# Patient Record
Sex: MALE | Race: WHITE | Employment: OTHER | URBAN - METROPOLITAN AREA
[De-identification: names, ages, dates, MRNs, and addresses within clinical notes are randomized per-mention and may not be internally consistent; named-entity substitution may affect disease eponyms.]

---

## 2017-05-28 ENCOUNTER — APPOINTMENT (OUTPATIENT)
Dept: GENERAL RADIOLOGY | Age: 72
End: 2017-05-28
Attending: EMERGENCY MEDICINE
Payer: MEDICARE

## 2017-05-28 ENCOUNTER — HOSPITAL ENCOUNTER (EMERGENCY)
Age: 72
Discharge: HOME | End: 2017-05-28
Attending: EMERGENCY MEDICINE
Payer: MEDICARE

## 2017-05-28 VITALS
RESPIRATION RATE: 16 BRPM | BODY MASS INDEX: 25.03 KG/M2 | HEIGHT: 74 IN | HEART RATE: 76 BPM | SYSTOLIC BLOOD PRESSURE: 133 MMHG | DIASTOLIC BLOOD PRESSURE: 73 MMHG | TEMPERATURE: 97.5 F | WEIGHT: 195 LBS | OXYGEN SATURATION: 97 %

## 2017-05-28 DIAGNOSIS — M79.5 FOREIGN BODY (FB) IN SOFT TISSUE: Primary | ICD-10-CM

## 2017-05-28 PROCEDURE — 99282 EMERGENCY DEPT VISIT SF MDM: CPT

## 2017-05-28 PROCEDURE — 90715 TDAP VACCINE 7 YRS/> IM: CPT | Performed by: EMERGENCY MEDICINE

## 2017-05-28 PROCEDURE — 74011250636 HC RX REV CODE- 250/636: Performed by: EMERGENCY MEDICINE

## 2017-05-28 PROCEDURE — 73130 X-RAY EXAM OF HAND: CPT

## 2017-05-28 PROCEDURE — 90471 IMMUNIZATION ADMIN: CPT

## 2017-05-28 PROCEDURE — 74011000250 HC RX REV CODE- 250: Performed by: EMERGENCY MEDICINE

## 2017-05-28 RX ORDER — KETOROLAC TROMETHAMINE 10 MG/1
TABLET, FILM COATED ORAL
COMMUNITY
End: 2018-03-19

## 2017-05-28 RX ORDER — BACITRACIN ZINC 500 [USP'U]/G
CREAM TOPICAL ONCE
Status: DISCONTINUED | OUTPATIENT
Start: 2017-05-28 | End: 2017-05-28 | Stop reason: HOSPADM

## 2017-05-28 RX ORDER — LIDOCAINE HYDROCHLORIDE 10 MG/ML
10 INJECTION, SOLUTION EPIDURAL; INFILTRATION; INTRACAUDAL; PERINEURAL ONCE
Status: COMPLETED | OUTPATIENT
Start: 2017-05-28 | End: 2017-05-28

## 2017-05-28 RX ORDER — CLINDAMYCIN HYDROCHLORIDE 300 MG/1
300 CAPSULE ORAL 4 TIMES DAILY
Qty: 16 CAP | Refills: 0 | Status: SHIPPED | OUTPATIENT
Start: 2017-05-28 | End: 2017-06-01

## 2017-05-28 RX ADMIN — TETANUS TOXOID, REDUCED DIPHTHERIA TOXOID AND ACELLULAR PERTUSSIS VACCINE, ADSORBED 0.5 ML: 5; 2.5; 8; 8; 2.5 SUSPENSION INTRAMUSCULAR at 12:19

## 2017-05-28 RX ADMIN — LIDOCAINE HYDROCHLORIDE 10 ML: 10 INJECTION, SOLUTION EPIDURAL; INFILTRATION; INTRACAUDAL; PERINEURAL at 12:20

## 2017-05-28 NOTE — ED TRIAGE NOTES
Pt has a large nail sticking through his right first finger. Pt denies any pain at this time. Reports he took 10 mg toradol at 1115 today. Pt unknown when his last tetanus shot was. Sensation present.

## 2017-05-28 NOTE — ED PROVIDER NOTES
HPI Comments: 73 y.o. male with past medical history significant for HTN, arthritis, prostate CA, GERD, sleep apnea, chronic pain, transfusion history, thromboembolus and DM who presents from home with chief complaint of wound. Per pt, he was working with a nail gun earlier this morning (5/28/2017) when the nail ejected and ricocheted and punctured through his right thumb (pt is right handed). His relative reports giving the pt 10 mg of Toradol around 1115 which provided moderate relief. He states is unsure when he last received a tetanus shot. While in the ED, the is present with nail still in place to his right thumb yet denies experiencing any pain at this time. He further denies fever, chills, N/V/D, CP, SOB, abd pain and urinary symptoms. There are no other acute medical concerns at this time. Social hx: Former smoker, No ETOH use      PCP: Thurlow Cockayne, MD    Note written by Hector Alvarenga, as dictated by Jr Milner. Jeffrey Sandoval MD 12:02 PM          The history is provided by the patient and a relative. Past Medical History:   Diagnosis Date    Arthritis     Spine and knees    Cancer ([address] 2008    Prostate    Chronic pain     Diabetes ([address]     controoled with diet and lost weight    GERD (gastroesophageal reflux disease)     Hypertension     Sleep apnea     compliant with c-pap    Thromboembolus ([address] 2008    DVT in one leg after Bilateral Knee replacement but doesn't remember which leg.     Transfusion history 2/11/2008    required 2units RBC        Past Surgical History:   Procedure Laterality Date    HX CERVICAL FUSION      HX HERNIA REPAIR Right 1983    Inguinal hernia repair    HX ORTHOPAEDIC Right 1987    right knee arthroscopic    HX ORTHOPAEDIC Right 1986    right wrist arthroscopic surgery    HX ORTHOPAEDIC Bilateral 2008    Knee arthroplasty    HX ORTHOPAEDIC Right 2014    index finger 1st joint cyst removed    HX PROSTATECTOMY  2008         Family History:   Problem Relation Age of Onset    Diabetes Mother     Alzheimer Father        Social History     Social History    Marital status:      Spouse name: N/A    Number of children: N/A    Years of education: N/A     Occupational History    Not on file. Social History Main Topics    Smoking status: Former Smoker     Packs/day: 0.00     Quit date: 2/4/1996    Smokeless tobacco: Never Used      Comment: occ cigars years ago Quit; smoked pipes    Alcohol use No    Drug use: No    Sexual activity: Not on file     Other Topics Concern    Not on file     Social History Narrative         ALLERGIES: Penicillins    Review of Systems   Constitutional: Negative for chills and fever. HENT: Negative for ear pain and sore throat. Eyes: Negative for pain. Respiratory: Negative for chest tightness and shortness of breath. Cardiovascular: Negative for chest pain and leg swelling. Gastrointestinal: Negative for abdominal pain, diarrhea, nausea and vomiting. Genitourinary: Negative for dysuria and flank pain. Musculoskeletal: Negative for back pain. Skin: Positive for wound (puncture wound to right thumb with nail still present). Negative for rash. Neurological: Negative for headaches. All other systems reviewed and are negative. Vitals:    05/28/17 1153   BP: 133/73   Pulse: 76   Resp: 16   Temp: 97.5 °F (36.4 °C)   SpO2: 97%   Weight: 88.5 kg (195 lb)   Height: 6' 2\" (1.88 m)            Physical Exam   Constitutional: He is oriented to person, place, and time. He appears well-developed and well-nourished. No distress. HENT:   Head: Normocephalic and atraumatic. Eyes: Pupils are equal, round, and reactive to light. No scleral icterus. Neck: Normal range of motion. Neck supple. No tracheal deviation present. Cardiovascular: Normal rate, regular rhythm, normal heart sounds and intact distal pulses. Exam reveals no gallop and no friction rub. No murmur heard.   Pulmonary/Chest: Effort normal and breath sounds normal. No respiratory distress. He has no wheezes. He has no rales. Abdominal: Soft. He exhibits no distension. There is no tenderness. There is no rebound and no guarding. Musculoskeletal: He exhibits no edema. Neurological: He is alert and oriented to person, place, and time. Sensation and motor intact to right thumb   Skin: Skin is warm and dry. Large nail present through right thumb pad, without active bleeding   Psychiatric: He has a normal mood and affect. Nursing note and vitals reviewed. Note written by Hector Johnson, as dictated by Diogenes Baxter. Tammy Sahu MD 12:02 PM    [facility]  ED Course       Foreign Body Removal  Date/Time: 5/28/2017 1:38 PM  Performed by: Li Simon Authorized by: Li Simon     Consent:     Consent obtained:  Verbal and written    Consent given by:  Patient  Location:     Location:  Finger    Finger location:  R thumb    Tendon involvement:  None  Procedure details:     Bloodless field: no      Removal mechanism:  Magnetic extraction    Foreign bodies recovered:  1    Description:  Large nail    Intact foreign body removal: yes    Post-procedure details:     Neurovascular status: intact      Confirmation:  No additional foreign bodies on visualization    Skin closure:  None    Dressing:  Antibiotic ointment and adhesive bandage    Patient tolerance of procedure: Tolerated well, no immediate complications        3:51 PM  The patient has been reevaluated. The patient is ready for discharge. The patient's signs, symptoms, diagnosis, and discharge instructions have been discussed and the patient/ family has conveyed their understanding. The patient is to follow up as recommended or return to the ED should their symptoms worsen. Plan has been discussed and the patient is in agreement. LABORATORY TESTS:  No results found for this or any previous visit (from the past 12 hour(s)).     IMAGING RESULTS:  XR HAND RT MIN 3 V   Final Result        Xr Hand Rt Min 3 V    Result Date: 5/28/2017  EXAM:  XR HAND RT MIN 3 V INDICATION:  fracture. COMPARISON: None. FINDINGS: Three views of the right hand demonstrate a nail traversing the palmar aspect of the distal thumb without visible associated fracture of the tuft. Incidental imaging of the remainder of the hand  demonstrates degenerative changes in the interphalangeal joints. IMPRESSION:  1. Nail through the palmar aspect of the distal thumb without visible associated fracture. MEDICATIONS GIVEN:  Medications   bacitracin (BACITRACIN) 500 unit/gram ointment (not administered)   diph,Pertuss(AC),Tet Vac-PF (BOOSTRIX) suspension 0.5 mL (0.5 mL IntraMUSCular Given 5/28/17 1219)   lidocaine (PF) (XYLOCAINE) 10 mg/mL (1 %) injection 10 mL (10 mL IntraDERMal Given by Provider 5/28/17 1220)       IMPRESSION:  1. Foreign body (FB) in soft tissue        PLAN:  1. Current Discharge Medication List      START taking these medications    Details   clindamycin (CLEOCIN) 300 mg capsule Take 1 Cap by mouth four (4) times daily for 4 days. Qty: 16 Cap, Refills: 0           2. Follow-up Information     Follow up With Details Comments [id number] Cait Aden MD Call  [facility] [address]  [phone]      [facility] EMERGENCY DEPT  If symptoms worsen [address] [place] [address]  [phone]          Return to ED for new or worsening symptoms       Alvin Jones.  Lyudmila Gil MD

## 2017-05-28 NOTE — DISCHARGE INSTRUCTIONS
We hope that we have addressed all of your medical concerns. The examination and treatment you received in the Emergency Department were for an emergent problem and were not intended as complete care. It is important that you follow up with your healthcare provider(s) for ongoing care. If your symptoms worsen or do not improve as expected, and you are unable to reach your usual health care provider(s), you should return to the Emergency Department. Today's healthcare is undergoing tremendous change, and patient satisfaction surveys are one of the many tools to assess the quality of medical care. You may receive a survey from the [employer] regarding your experience in the Emergency Department. I hope that your experience has been completely positive, particularly the medical care that I provided. As such, please participate in the survey; anything less than excellent does not meet my expectations or intentions. [facility] and [address] [place] [address] participate in nationally recognized quality of care measures. If your blood pressure is greater than 120/80, as reported below, we urge that you seek medical care to address the potential of high blood pressure, commonly known as hypertension. Hypertension can be hereditary or can be caused by certain medical conditions, pain, stress, or \"white coat syndrome. \"       Please make an appointment with your health care provider(s) for follow up of your Emergency Department visit. VITALS:   Patient Vitals for the past 8 hrs:   Temp Pulse Resp BP SpO2   05/28/17 1153 97.5 °F (36.4 °C) 76 16 133/73 97 %          Thank you for allowing us to provide you with medical care today. We realize that you have many choices for your emergency care needs. Please choose us in the future for any continued health care needs. Israel Gaitan, [address] [place] [address] [address] Office: [phone]            No results found for this or any previous visit (from the past 24 hour(s)). Xr Hand Rt Min 3 V    Result Date: 5/28/2017  EXAM:  XR HAND RT MIN 3 V INDICATION:  fracture. COMPARISON: None. FINDINGS: Three views of the right hand demonstrate a nail traversing the palmar aspect of the distal thumb without visible associated fracture of the tuft. Incidental imaging of the remainder of the hand  demonstrates degenerative changes in the interphalangeal joints. IMPRESSION:  1. Nail through the palmar aspect of the distal thumb without visible associated fracture.

## 2018-03-19 ENCOUNTER — APPOINTMENT (OUTPATIENT)
Dept: MRI IMAGING | Age: 73
End: 2018-03-19
Attending: INTERNAL MEDICINE
Payer: MEDICARE

## 2018-03-19 ENCOUNTER — HOSPITAL ENCOUNTER (OUTPATIENT)
Age: 73
Setting detail: OBSERVATION
Discharge: HOME | End: 2018-03-20
Attending: EMERGENCY MEDICINE | Admitting: INTERNAL MEDICINE
Payer: MEDICARE

## 2018-03-19 DIAGNOSIS — G45.9 TRANSIENT CEREBRAL ISCHEMIA, UNSPECIFIED TYPE: Primary | ICD-10-CM

## 2018-03-19 PROBLEM — K21.9 GERD (GASTROESOPHAGEAL REFLUX DISEASE): Status: ACTIVE | Noted: 2018-03-19

## 2018-03-19 PROBLEM — G45.4 TRANSIENT GLOBAL AMNESIA: Status: ACTIVE | Noted: 2018-03-19

## 2018-03-19 PROBLEM — I10 HTN (HYPERTENSION): Status: ACTIVE | Noted: 2018-03-19

## 2018-03-19 PROBLEM — G47.33 OSA (OBSTRUCTIVE SLEEP APNEA): Status: ACTIVE | Noted: 2018-03-19

## 2018-03-19 LAB
ALBUMIN SERPL-MCNC: 3.8 G/DL (ref 3.5–5)
ALBUMIN/GLOB SERPL: 1 {RATIO} (ref 1.1–2.2)
ALP SERPL-CCNC: 80 U/L (ref 45–117)
ALT SERPL-CCNC: 37 U/L (ref 12–78)
ANION GAP SERPL CALC-SCNC: 12 MMOL/L (ref 5–15)
AST SERPL-CCNC: 27 U/L (ref 15–37)
BASOPHILS # BLD: 0.1 K/UL (ref 0–0.1)
BASOPHILS NFR BLD: 1 % (ref 0–1)
BILIRUB SERPL-MCNC: 0.3 MG/DL (ref 0.2–1)
BUN SERPL-MCNC: 27 MG/DL (ref 6–20)
BUN/CREAT SERPL: 33 (ref 12–20)
CALCIUM SERPL-MCNC: 8.9 MG/DL (ref 8.5–10.1)
CHLORIDE SERPL-SCNC: 104 MMOL/L (ref 97–108)
CO2 SERPL-SCNC: 25 MMOL/L (ref 21–32)
CREAT SERPL-MCNC: 0.81 MG/DL (ref 0.7–1.3)
DIFFERENTIAL METHOD BLD: NORMAL
EOSINOPHIL # BLD: 0.4 K/UL (ref 0–0.4)
EOSINOPHIL NFR BLD: 5 % (ref 0–7)
ERYTHROCYTE [DISTWIDTH] IN BLOOD BY AUTOMATED COUNT: 13.9 % (ref 11.5–14.5)
GLOBULIN SER CALC-MCNC: 3.7 G/DL (ref 2–4)
GLUCOSE BLD STRIP.AUTO-MCNC: 161 MG/DL (ref 65–100)
GLUCOSE SERPL-MCNC: 103 MG/DL (ref 65–100)
HCT VFR BLD AUTO: 39.9 % (ref 36.6–50.3)
HGB BLD-MCNC: 13.5 G/DL (ref 12.1–17)
IMM GRANULOCYTES # BLD: 0 K/UL (ref 0–0.04)
IMM GRANULOCYTES NFR BLD AUTO: 0 % (ref 0–0.5)
LYMPHOCYTES # BLD: 1.9 K/UL (ref 0.8–3.5)
LYMPHOCYTES NFR BLD: 25 % (ref 12–49)
MCH RBC QN AUTO: 29.4 PG (ref 26–34)
MCHC RBC AUTO-ENTMCNC: 33.8 G/DL (ref 30–36.5)
MCV RBC AUTO: 86.9 FL (ref 80–99)
MONOCYTES # BLD: 0.7 K/UL (ref 0–1)
MONOCYTES NFR BLD: 8 % (ref 5–13)
NEUTS SEG # BLD: 4.8 K/UL (ref 1.8–8)
NEUTS SEG NFR BLD: 61 % (ref 32–75)
NRBC # BLD: 0 K/UL (ref 0–0.01)
NRBC BLD-RTO: 0 PER 100 WBC
PLATELET # BLD AUTO: 182 K/UL (ref 150–400)
PMV BLD AUTO: 11.3 FL (ref 8.9–12.9)
POTASSIUM SERPL-SCNC: 4.1 MMOL/L (ref 3.5–5.1)
PROT SERPL-MCNC: 7.5 G/DL (ref 6.4–8.2)
RBC # BLD AUTO: 4.59 M/UL (ref 4.1–5.7)
SERVICE CMNT-IMP: ABNORMAL
SODIUM SERPL-SCNC: 141 MMOL/L (ref 136–145)
WBC # BLD AUTO: 7.9 K/UL (ref 4.1–11.1)

## 2018-03-19 PROCEDURE — 36415 COLL VENOUS BLD VENIPUNCTURE: CPT | Performed by: FAMILY MEDICINE

## 2018-03-19 PROCEDURE — 99218 HC RM OBSERVATION: CPT

## 2018-03-19 PROCEDURE — A9576 INJ PROHANCE MULTIPACK: HCPCS | Performed by: RADIOLOGY

## 2018-03-19 PROCEDURE — 99284 EMERGENCY DEPT VISIT MOD MDM: CPT

## 2018-03-19 PROCEDURE — 80053 COMPREHEN METABOLIC PANEL: CPT | Performed by: FAMILY MEDICINE

## 2018-03-19 PROCEDURE — 70553 MRI BRAIN STEM W/O & W/DYE: CPT

## 2018-03-19 PROCEDURE — 96372 THER/PROPH/DIAG INJ SC/IM: CPT

## 2018-03-19 PROCEDURE — 74011250636 HC RX REV CODE- 250/636: Performed by: INTERNAL MEDICINE

## 2018-03-19 PROCEDURE — 82962 GLUCOSE BLOOD TEST: CPT

## 2018-03-19 PROCEDURE — 93005 ELECTROCARDIOGRAM TRACING: CPT

## 2018-03-19 PROCEDURE — 85025 COMPLETE CBC W/AUTO DIFF WBC: CPT | Performed by: FAMILY MEDICINE

## 2018-03-19 PROCEDURE — 74011250636 HC RX REV CODE- 250/636: Performed by: RADIOLOGY

## 2018-03-19 RX ORDER — SODIUM CHLORIDE 0.9 % (FLUSH) 0.9 %
5-10 SYRINGE (ML) INJECTION AS NEEDED
Status: DISCONTINUED | OUTPATIENT
Start: 2018-03-19 | End: 2018-03-20 | Stop reason: HOSPADM

## 2018-03-19 RX ORDER — ACETAMINOPHEN 325 MG/1
650 TABLET ORAL
Status: DISCONTINUED | OUTPATIENT
Start: 2018-03-19 | End: 2018-03-20 | Stop reason: HOSPADM

## 2018-03-19 RX ORDER — ENOXAPARIN SODIUM 100 MG/ML
40 INJECTION SUBCUTANEOUS EVERY 24 HOURS
Status: DISCONTINUED | OUTPATIENT
Start: 2018-03-19 | End: 2018-03-20

## 2018-03-19 RX ORDER — INSULIN LISPRO 100 [IU]/ML
INJECTION, SOLUTION INTRAVENOUS; SUBCUTANEOUS
Status: DISCONTINUED | OUTPATIENT
Start: 2018-03-19 | End: 2018-03-20

## 2018-03-19 RX ORDER — ACETAMINOPHEN 650 MG/1
650 SUPPOSITORY RECTAL
Status: DISCONTINUED | OUTPATIENT
Start: 2018-03-19 | End: 2018-03-20

## 2018-03-19 RX ORDER — ASPIRIN 81 MG/1
81 TABLET ORAL
COMMUNITY

## 2018-03-19 RX ORDER — LOSARTAN POTASSIUM 100 MG/1
100 TABLET ORAL
COMMUNITY

## 2018-03-19 RX ORDER — GUAIFENESIN 100 MG/5ML
81 LIQUID (ML) ORAL DAILY
Status: DISCONTINUED | OUTPATIENT
Start: 2018-03-20 | End: 2018-03-20

## 2018-03-19 RX ORDER — SODIUM CHLORIDE 0.9 % (FLUSH) 0.9 %
5-10 SYRINGE (ML) INJECTION EVERY 8 HOURS
Status: DISCONTINUED | OUTPATIENT
Start: 2018-03-19 | End: 2018-03-20 | Stop reason: HOSPADM

## 2018-03-19 RX ORDER — MAGNESIUM SULFATE 100 %
4 CRYSTALS MISCELLANEOUS AS NEEDED
Status: DISCONTINUED | OUTPATIENT
Start: 2018-03-19 | End: 2018-03-20

## 2018-03-19 RX ORDER — DEXTROSE 50 % IN WATER (D50W) INTRAVENOUS SYRINGE
12.5-25 AS NEEDED
Status: DISCONTINUED | OUTPATIENT
Start: 2018-03-19 | End: 2018-03-20

## 2018-03-19 RX ADMIN — ENOXAPARIN SODIUM 40 MG: 40 INJECTION SUBCUTANEOUS at 20:01

## 2018-03-19 RX ADMIN — GADOTERIDOL 17 ML: 279.3 INJECTION, SOLUTION INTRAVENOUS at 20:23

## 2018-03-19 RX ADMIN — Medication 10 ML: at 23:31

## 2018-03-19 NOTE — IP AVS SNAPSHOT
[facility] 
 
 
 [address] [place] [address] 
[phone] Patient: Desmond Singh MRN: [id number] [id number] About your hospitalization You were admitted on:  March 19, 2018 You last received care in the:  [facility]  MED SURG 2 You were discharged on:  March 20, 2018 Why you were hospitalized Your primary diagnosis was:  Transient Global Amnesia Your diagnoses also included:  Tia (Transient Ischemic Attack), Cervical Stenosis Of Spine, Estuardo (Obstructive Sleep Apnea), Gerd (Gastroesophageal Reflux Disease), Htn (Hypertension) Follow-up Information Follow up With Details Comments Contact Info Diane Valenzuela MD   [address] [place] [address] [address] [place] [address] 
[phone] Deepthi Weems MD Schedule an appointment as soon as possible for a visit If symptoms worsen [address] [facility] [address] [id number] 
[phone] Your Scheduled Appointments Thursday April 05, 2018  8:00 AM EDT  
PREADMISSION TESTING with [facility] PAT ASSESSMENT C [facility] (RI OR PRE ASSESSMENT)  
 [address] [place] [address]  
[phone] Thursday April 12, 2018 HERNIA VENTRAL REPAIR LAPAROSCOPIC with Asim Haynes MD  
1FM SURGERY (RI OR PRE ASSESSMENT)  
 [address] [place] [address]  
[phone] Discharge Orders None A check delfin indicates which time of day the medication should be taken. My Medications CONTINUE taking these medications Instructions Each Dose to Equal  
 Morning Noon Evening Bedtime  
 aspirin delayed-release 81 mg tablet Your last dose was: Your next dose is: Take 81 mg by mouth nightly. 81 mg  
    
   
   
   
  
 azelastine 137 mcg (0.1 %) nasal spray Commonly known as:  ASTELIN Your last dose was: Your next dose is: 1 Lucas by Both Nostrils route as needed for Rhinitis. Use in each nostril as directed 1 Spray  
    
   
   
   
  
 diazePAM 5 mg tablet Commonly known as:  VALIUM Your last dose was: Your next dose is: Take 1 Tab by mouth every eight (8) hours as needed (Spasms). Max Daily Amount: 15 mg.  
 5 mg  
    
   
   
   
  
 fexofenadine-pseudoephedrine  mg Tb12 Commonly known as:  ALLEGRA-D Your last dose was: Your next dose is: Take 1 Tab by mouth every twelve (12) hours as needed. 1 Tab FISH OIL PO Your last dose was: Your next dose is: Take 2 Caps by mouth nightly. 2 Cap  
    
   
   
   
  
 losartan 100 mg tablet Commonly known as:  COZAAR Your last dose was: Your next dose is: Take 100 mg by mouth nightly. 100 mg  
    
   
   
   
  
 mometasone 0.1 % ointment Commonly known as:  Aleksandar Omalley Your last dose was: Your next dose is:    
   
   
 Apply  to affected area as needed for Skin Irritation. multivitamin tablet Commonly known as:  ONE A DAY Your last dose was: Your next dose is: Take 1 Tab by mouth daily (after breakfast). 1 Tab  
    
   
   
   
  
 pantoprazole 20 mg tablet Commonly known as:  PROTONIX Your last dose was: Your next dose is: Take 20 mg by mouth Daily (before breakfast). 20 mg Discharge Instructions Patient Discharge Instructions Lou Wagoner / [id number] : 1945 Admitted 3/19/2018 Discharged: 3/20/2018 Primary Diagnoses Problem List as of 3/20/2018  Date Reviewed: 3/19/2018 Codes Class Noted - Resolved CECIL (obstructive sleep apnea) GERD (gastroesophageal reflux disease) HTN (hypertension) * (Principal)Transient global amnesia Cervical stenosis of spine Spinal stenosis Take Home Medications · It is important that you take the medication exactly as they are prescribed. · Keep your medication in the bottles provided by the pharmacist and keep a list of the medication names, dosages, and times to be taken in your wallet. · Do not take other medications without consulting your doctor. What to do at [facility] Recommended diet: Cardiac Diet and Low fat, Low cholesterol Recommended activity: Activity as tolerated If you experience worse symptoms, please follow up with Dr Shante Tucker in neurology. Follow-up with your PCP in a few weeks Transient Global Amnesia: Care Instructions Your Care Instructions Transient global amnesia is a rare type of amnesia that causes sudden memory loss. When this happens you cannot remember events from your recent past or make new memories. You may also not know where you are, why you are there, or what the date is. You may even ask the same question many times. Unlike other types of amnesia, you do know who you are and you can recognize people that you know. An episode usually does not last more than 6 hours and it rarely happens again. What causes transient global amnesia is not fully known. But, in some cases, migraines, an intense workout, sex, or stress may cause an episode. Your doctor probably did an exam and ran some tests to rule out certain health problems that can also cause sudden memory loss, such as a stroke, brain tumor, seizure, head injury, or an infection. If your doctor did not find any of these things to be the cause of your memory loss, you will not need treatment and you can go back to your usual activities. Although you may never be able to remember what happened right before or during the episode, the rest of your memory should come back. Follow-up care is a key part of your treatment and safety.  Be sure to make and go to all appointments, and call your doctor if you are having problems. It is also a good idea to know your test results and keep a list of the medicines you take. How can you care for yourself at home? · Take good care of yourself. Eat a balanced, low-fat diet. Get plenty of rest. Limit how much alcohol you drink. Do not smoke. If you need help quitting, talk to your doctor about stop-smoking programs and medicines. These can increase your chances of quitting for good. · Find healthy ways to deal with stress. Regular exercise is one of the best ways to manage stress. · Talk to a counselor if you're concerned about what happened. When should you call for help? Call 911 anytime you think you may need emergency care. For example, call if: 
? · You passed out (lost consciousness). ? · You have symptoms of a stroke. These may include: 
¨ Sudden numbness, tingling, weakness, or loss of movement in your face, arm, or leg, especially on only one side of your body. ¨ Sudden vision changes. ¨ Sudden trouble speaking. ¨ Sudden confusion or trouble understanding simple statements. ¨ Sudden problems with walking or balance. ¨ A sudden, severe headache that is different from past headaches. ? · You develop a fever with a stiff neck or a severe headache. ? · You do not know who you are or where you are. ?Call your doctor now or seek immediate medical care if: 
? · You suddenly lose your memory again. ? · You have a seizure. ? · You are dizzy. ? · You are more confused, forgetful, or upset than usual.  
? · You notice changes in your behavior or personality. ? · You begin to have trouble with familiar things, such as how to read or how to tell time. ? Watch closely for changes in your health, and be sure to contact your doctor if you have any problems. Where can you learn more? Go to http://pearl-eduardo.info/. Enter I190 in the search box to learn more about \"Transient Global Amnesia: Care Instructions. \" Current as of: October 14, 2016 Content Version: 11.4 © 2627-3286 [employer]. Care instructions adapted under license by [employer] (which disclaims liability or warranty for this information). If you have questions about a medical condition or this instruction, always ask your healthcare professional. [address] any warranty or liability for your use of this information. Information obtained by : 
I understand that if any problems occur once I am at home I am to contact my physician. I understand and acknowledge receipt of the instructions indicated above. Physician's or R.N.'s Signature                                                                  Date/Time Patient or Representative Signature                                                          Date/Time [employer] Announcement We are excited to announce that we are making your provider's discharge notes available to you in [employer]. You will see these notes when they are completed and signed by the physician that discharged you from your recent hospital stay. If you have any questions or concerns about any information you see in [employer], please call the Health Information Department where you were seen or reach out to your Primary Care Provider for more information about your plan of care. Introducing [facility]! Dear Bharat Gray: Thank you for requesting a [employer] account. Our records indicate that you already have an active [employer] account. You can access your account anytime at [email or web address] Did you know that you can access your hospital and ER discharge instructions at any time in [employer]? You can also review all of your test results from your hospital stay or ER visit. Additional Information If you have questions, please visit the Frequently Asked Questions section of the [employer] website at [email or web address] Remember, [employer] is NOT to be used for urgent needs. For medical emergencies, dial 911. Now available from your iPhone and Android! Unresulted Labs-Please follow up with your PCP about these lab tests Order Current Status DUPLEX CAROTID BILATERAL Preliminary result Providers Seen During Your Hospitalization Provider Specialty Primary office phone Philly Juarez MD Emergency Medicine [phone] Genny Reyes MD Hospitalist [phone] Klein Kee MD Internal Medicine [phone] Your Primary Care Physician (PCP) Primary Care Physician Office Phone Office Fax Darius Cheryl [phone] You are allergic to the following Allergen Reactions Penicillins Hives Childhood Adderall (Dextroamphetamine-Amphetamine) Other (comments) Mouth sores and tongue swelling Recent Documentation Height Weight BMI Smoking Status 1.88 m 88.9 kg 25.16 kg/m2 Former Smoker Emergency Contacts Name Discharge Info Relation Home Work Mobile Mikaela Isidro DISCHARGE CAREGIVER [3] Spouse [3] [phone] Patient Belongings The following personal items are in your possession at time of discharge: 
  Dental Appliances: None  Visual Aid: Glasses, With patient   Hearing Aids/Status: At bedside, Left, Right  Home Medications: None   Jewelry: Ring, With patient  Clothing: At bedside, Shirt, Socks, Undergarments, Jacket/Coat, Footwear    Other Valuables: Eyeglasses, [employer], At bedside Please provide this summary of care documentation to your next provider. Signatures-by signing, you are acknowledging that this After Visit Summary has been reviewed with you and you have received a copy. Patient Signature:  ____________________________________________________________ Date:  ____________________________________________________________  
  
Jenna Medico Provider Signature:  ____________________________________________________________ Date:  ____________________________________________________________

## 2018-03-19 NOTE — ED NOTES
Bedside and Verbal shift change report given to Monty Garcia RN (oncoming nurse) by Autumn Felton RN (offgoing nurse). Report included the following information SBAR, Kardex, ED Summary, Procedure Summary, Intake/Output, MAR and Recent Results.

## 2018-03-19 NOTE — H&P
[address]  [phone]    Admission History and Physical      NAME:  Ramirez Sierra   :   1945   MRN:  [id number]     PCP:  Kun Bello MD     Date/Time:  3/19/2018         Subjective:     CHIEF COMPLAINT: confusion      HISTORY OF PRESENT ILLNESS:     Mr. Leighton Valentine is a 81 y.o.  male who is admitted with transient global amneia. Mr. Leighton Valentine with PMH of HTN, CECIL, prostate cancer, GERD, chronic pain presented to ER c/o transient confusion which happened yesterday afternoon. Pt was working for about an hour in the yard and later in the afternoon, he went in the house to his office to pay the , but couldn't remember why he was there and what he was suppose to do. His wife came later and found him sitting in a chair confused. He told her he couldn't remember what he was supposed to do. He couldn't remember how much he was supposed to pay the yard woker. This lasted about 2 hours and later in the evening, he was back to his baseline. Now, from yesterday's event,  he remembers only sitting in a chair in his office, but doesn't remember the rest. According to his wife, she checked his blood sugar and was 135 and BP was normal. Never had similar problems in the past. Pt went to see his PCP who sent him to ER for further evaluation. Past Medical History:   Diagnosis Date    Arthritis     Spine and knees    Cancer ([address]     Prostate    Chronic pain     Diabetes ([address]     controoled with diet and lost weight    GERD (gastroesophageal reflux disease)     Hypertension     Sleep apnea     compliant with c-pap    Thromboembolus ([address]     DVT in one leg after Bilateral Knee replacement but doesn't remember which leg.     Transfusion history 2008    required 2units RBC         Past Surgical History:   Procedure Laterality Date    HX CERVICAL FUSION      HX HERNIA REPAIR Right     Inguinal hernia repair   [employer] HX ORTHOPAEDIC Right 1987    right knee arthroscopic    HX ORTHOPAEDIC Right 1986    right wrist arthroscopic surgery    HX ORTHOPAEDIC Bilateral 2008    Knee arthroplasty    HX ORTHOPAEDIC Right 2014    index finger 1st joint cyst removed    HX PROSTATECTOMY  2008       Social History   Substance Use Topics    Smoking status: Former Smoker     Packs/day: 0.00     Quit date: 2/4/1996    Smokeless tobacco: Never Used      Comment: occ cigars years ago Quit; smoked pipes    Alcohol use No        Family History   Problem Relation Age of Onset    Diabetes Mother     Alzheimer Father         Allergies   Allergen Reactions    Penicillins Hives     Childhood    Adderall [Dextroamphetamine-Amphetamine] Other (comments)     Mouth sores and tongue swelling        Prior to Admission medications    Medication Sig Start Date End Date Taking? Authorizing Provider   ketorolac (TORADOL) 10 mg tablet Take  by mouth. Phys Kajal, MD   omega-3 fatty acids-vitamin e (FISH OIL) 1,000 mg cap Take 2 Caps by mouth daily. Phys MD Kajal   diazepam (VALIUM) 5 mg tablet Take 1 Tab by mouth every eight (8) hours as needed (Spasms). Max Daily Amount: 15 mg. 2/9/16   Andra Smallwood NP   pantoprazole (PROTONIX) 20 mg tablet Take 20 mg by mouth Daily (before breakfast). Historical Provider   multivitamin (ONE A DAY) tablet Take 1 Tab by mouth daily (after breakfast). Historical Provider   losartan (COZAAR) 50 mg tablet Take 25 mg by mouth nightly. Historical Provider   azelastine (ASTELIN) 137 mcg (0.1 %) nasal spray 1 [place] by Both Nostrils route as needed for Rhinitis. Use in each nostril as directed    Historical Provider   fexofenadine-pseudoephedrine (ALLEGRA-D)  mg Tb12 Take 1 Tab by mouth every twelve (12) hours as needed. Historical Provider   mometasone (ELOCON) 0.1 % ointment Apply  to affected area as needed for Skin Irritation.     Historical Provider         Review of Systems:  (bold if positive, if negative)    Gen:  Eyes:  ENT:  CVS:  Pulm:  GI:    :    MS:  Skin:  Psych:  Endo:    Hem:  Renal:    Neuro:            Objective:      VITALS:    Vital signs reviewed; most recent are:    Visit Vitals    /74    Pulse 78    Temp 97.9 °F (36.6 °C)    Resp 18    Ht 6' 2\" (1.88 m)    Wt 88.9 kg (196 lb)    SpO2 98%    BMI 25.16 kg/m2     SpO2 Readings from Last 6 Encounters:   03/19/18 98%   05/28/17 97%   08/17/16 94%   02/09/16 99%   02/04/16 96%        No intake or output data in the 24 hours ending 03/19/18 1828         Exam:     Physical Exam:    Gen:  Well-developed, well-nourished, in no acute distress  HEENT:  Pink conjunctivae, PERRL, hearing intact to voice, moist mucous membranes  Neck:  Supple, without masses, thyroid non-tender  Resp:  No accessory muscle use, clear breath sounds without wheezes rales or rhonchi  Card:  No murmurs, normal S1, S2 without thrills, bruits or peripheral edema  Abd:  Soft, non-tender, non-distended, normoactive bowel sounds are present, no palpable organomegaly and no detectable hernias  Lymph:  No cervical or inguinal adenopathy  Musc:  No cyanosis or clubbing  Skin:  No rashes or ulcers, skin turgor is good  Neuro:  Cranial nerves are grossly intact, no focal motor weakness, follows commands appropriately  Psych:  Good insight, oriented to person, place and time, alert       Labs:    Recent Labs      03/19/18   1724   WBC  7.9   HGB  13.5   HCT  39.9   PLT  182     Recent Labs      03/19/18   1724   NA  141   K  4.1   CL  104   CO2  25   GLU  103*   BUN  27*   CREA  0.81   CA  8.9   ALB  3.8   TBILI  0.3   SGOT  27   ALT  37     Lab Results   Component Value Date/Time    Glucose (POC) 113 (H) 02/08/2016 01:43 PM    Glucose (POC) 108 (H) 02/08/2016 10:54 AM     No results for input(s): PH, PCO2, PO2, HCO3, FIO2 in the last 72 hours. No results for input(s): INR in the last 72 hours.     No lab exists for component: INREXT    Telemetry reviewed: Assessment/Plan:    1. Transient global amnesia (3/19/2018) vs TIA (transient ischemic attack) (3/19/2018). Now, pt is back to his baseline. No neurological deficit. Admit under observation and check MRI of the alma with and without contrast.  Neuro check     2. CECIL (obstructive sleep apnea) (3/19/2018). May use own CPAP. 3.  HTN (hypertension) (3/19/2018). Continue home losartan     4. GERD (gastroesophageal reflux disease) (3/19/2018). On PPI    5. Cervical stenosis of spine (2/8/2016). Continue pain meds    6. DM. Diet controlled. Diabetic diet. Check A1C.        Previous medical records reviewed     Risk of deterioration: high      Total time spent with patient: [address] discussed with: Patient, Family, Nursing Staff and >50% of time spent in counseling and coordination of care    Discussed:  Care Plan    Prophylaxis:  Lovenox    Probable Disposition:  Home w/Family           ___________________________________________________    Attending Physician: Genny Reyes MD

## 2018-03-19 NOTE — Clinical Note
Patient Class[de-identified] Observation [084] Type of Bed: Telemetry [19] Reason for Observation: tia Admitting Diagnosis: TIA (transient ischemic attack) [166693] Admitting Physician: Anna Guerrero Attending Physician: Anna Guerrero

## 2018-03-19 NOTE — ED PROVIDER NOTES
HPI Comments: Pt is a 82 yo M with h/o HTN, Diet controlled DM and HLD sent to ED from PCP office for evalution of TIA. Per pt he had an episode of memory loss yesterday ~5pm for 20-30 mins. Per wife he went up to home office to pay the  and totally forgot about it. Wife went to check on him pt was staring and asked her what he is doing and how much to pay the . He denies any LOC, HA, blurred vision, chest pain, SOB, dizziness, weakness or numbness, seizure activity or fall. Pt denies any prior episode. He went to PCP and saw Dr. Regina Lea today and sent over to ED for TIA evaluation. The episode completely resolved and pt feels ok. PCP office called ED regarding this pt    Patient is a 81 y.o. male presenting with other event. Other   Pertinent negatives include no chest pain, no abdominal pain, no headaches and no shortness of breath. Past Medical History:   Diagnosis Date    Arthritis     Spine and knees    Cancer ([address] 2008    Prostate    Chronic pain     Diabetes ([address]     controoled with diet and lost weight    GERD (gastroesophageal reflux disease)     Hypertension     Sleep apnea     compliant with c-pap    Thromboembolus ([address] 2008    DVT in one leg after Bilateral Knee replacement but doesn't remember which leg.     Transfusion history 2/11/2008    required 2units RBC        Past Surgical History:   Procedure Laterality Date    HX CERVICAL FUSION      HX HERNIA REPAIR Right 1983    Inguinal hernia repair    HX ORTHOPAEDIC Right 1987    right knee arthroscopic    HX ORTHOPAEDIC Right 1986    right wrist arthroscopic surgery    HX ORTHOPAEDIC Bilateral 2008    Knee arthroplasty    HX ORTHOPAEDIC Right 2014    index finger 1st joint cyst removed    HX PROSTATECTOMY  2008         Family History:   Problem Relation Age of Onset    Diabetes Mother     Alzheimer Father        Social History     Social History    Marital status:      Spouse name: N/A   [facility] Number of children: N/A    Years of education: N/A     Occupational History    Not on file. Social History Main Topics    Smoking status: Former Smoker     Packs/day: 0.00     Quit date: 2/4/1996    Smokeless tobacco: Never Used      Comment: occ cigars years ago Quit; smoked pipes    Alcohol use No    Drug use: No    Sexual activity: Not on file     Other Topics Concern    Not on file     Social History Narrative         ALLERGIES: Penicillins and Adderall [dextroamphetamine-amphetamine]    Review of Systems   Constitutional: Negative for appetite change, chills and fever. HENT: Negative for ear pain and sore throat. Eyes: Negative for photophobia. Respiratory: Negative for cough and shortness of breath. Cardiovascular: Negative for chest pain, palpitations and leg swelling. Gastrointestinal: Negative for abdominal pain. Neurological: Negative for dizziness, seizures, syncope, facial asymmetry, speech difficulty, weakness, light-headedness, numbness and headaches. Psychiatric/Behavioral: Negative for confusion. Vitals:    03/19/18 1632   BP: 147/79   Pulse: 78   Resp: 18   Temp: 97.9 °F (36.6 °C)   SpO2: 96%   Weight: 88.9 kg (196 lb)   Height: 6' 2\" (1.88 m)            Physical Exam   Constitutional: He is oriented to person, place, and time. He appears well-developed and well-nourished. HENT:   Head: Atraumatic. Nose: Nose normal.   Mouth/Throat: Oropharynx is clear and moist.   Eyes: Conjunctivae and EOM are normal. Pupils are equal, round, and reactive to light. Neck: Normal range of motion. Neck supple. No JVD present. Cardiovascular: Normal rate and regular rhythm. Pulmonary/Chest: Effort normal and breath sounds normal.   Abdominal: Soft. Bowel sounds are normal.   Musculoskeletal: Normal range of motion. Neurological: He is alert and oriented to person, place, and time. He has normal reflexes. He displays normal reflexes. No cranial nerve deficit.  He exhibits normal muscle tone. Coordination normal.   Skin: Skin is warm and dry. MDM  Number of Diagnoses or Management Options  Transient cerebral ischemia, unspecified type:   Diagnosis management comments: CVA, TIA with risk factors HTN, DM2, HLD    EKG, CMP, BC and will admit pt for further inpatient work up    D/w Dr. Jovanna Hobbs and/or Complexity of Data Reviewed  Clinical lab tests: ordered and reviewed  Discuss the patient with other providers: yes      I personally saw and examined the patient. I have reviewed and agree with the residents findings, including all diagnostic interpretations, and plans as written. I was present during the key portions of separately billed procedures.   Spoke to Dr. Alan Banks to admit for TIA work-up per Dr. Vimal Suarez request  Jose Estes MD      ED Course       Procedures

## 2018-03-19 NOTE — H&P
[address]  [phone]    Admission History and Physical      NAME:  Geeta Orellana   :   1945   MRN:  [id number]     PCP:  Chucky Vela MD     Date/Time:  3/19/2018         Subjective:     CHIEF COMPLAINT: confusion      HISTORY OF PRESENT ILLNESS:     Mr. Alejandra Knowles is a 81 y.o.  male who is admitted with transient global amneia. Mr. Alejandra Knowles with PMH of HTN, CECIL, prostate cancer, GERD, chronic pain presented to ER c/o transient confusion which happened yesterday afternoon. Pt was working for about an hour in the yard and later in the afternoon, he went in the house to his office to pay the , but couldn't remember why he was there and what he was suppose to do. His wife came later and found him sitting in a chair confused. He told her he couldn't remember what he was supposed to do. He couldn't remember how much he was supposed to pay the yard woker. This lasted about 2 hours and later in the evening, he was back to his baseline. Now, from yesterday's event,  he remembers only sitting in a chair in his office, but doesn't remember the rest. According to his wife, she checked his blood sugar and was 135 and BP was normal. Never had similar problems in the past. No Hx of seizure. Pt went to see his PCP who sent him to ER for further evaluation. Past Medical History:   Diagnosis Date    Arthritis     Spine and knees    [facility]     Prostate    Chronic pain     Diabetes ([address]     controoled with diet and lost weight    GERD (gastroesophageal reflux disease)     Hypertension     Sleep apnea     compliant with c-pap    Thromboembolus ([facility]     DVT in one leg after Bilateral Knee replacement but doesn't remember which leg.     Transfusion history 2008    required 2units RBC         Past Surgical History:   Procedure Laterality Date    HX CERVICAL FUSION      HX HERNIA REPAIR Right 1983    Inguinal hernia repair    HX ORTHOPAEDIC Right 1987    right knee arthroscopic    HX ORTHOPAEDIC Right 1986    right wrist arthroscopic surgery    HX ORTHOPAEDIC Bilateral 2008    Knee arthroplasty    HX ORTHOPAEDIC Right 2014    index finger 1st joint cyst removed    HX PROSTATECTOMY  2008       Social History   Substance Use Topics    Smoking status: Former Smoker     Packs/day: 0.00     Quit date: 2/4/1996    Smokeless tobacco: Never Used      Comment: occ cigars years ago Quit; smoked pipes    Alcohol use No        Family History   Problem Relation Age of Onset    Diabetes Mother     Alzheimer Father         Allergies   Allergen Reactions    Penicillins Hives     Childhood    Adderall [Dextroamphetamine-Amphetamine] Other (comments)     Mouth sores and tongue swelling        Prior to Admission medications    Medication Sig Start Date End Date Taking? Authorizing Provider   ketorolac (TORADOL) 10 mg tablet Take  by mouth. Phys Other, MD   omega-3 fatty acids-vitamin e (FISH OIL) 1,000 mg cap Take 2 Caps by mouth daily. Phys Other, MD   diazepam (VALIUM) 5 mg tablet Take 1 Tab by mouth every eight (8) hours as needed (Spasms). Max Daily Amount: 15 mg. 2/9/16   Cindy Mac NP   pantoprazole (PROTONIX) 20 mg tablet Take 20 mg by mouth Daily (before breakfast). Historical Provider   multivitamin (ONE A DAY) tablet Take 1 Tab by mouth daily (after breakfast). Historical Provider   losartan (COZAAR) 50 mg tablet Take 25 mg by mouth nightly. Historical Provider   azelastine (ASTELIN) 137 mcg (0.1 %) nasal spray 1 [place] by Both Nostrils route as needed for Rhinitis. Use in each nostril as directed    Historical Provider   fexofenadine-pseudoephedrine (ALLEGRA-D)  mg Tb12 Take 1 Tab by mouth every twelve (12) hours as needed. Historical Provider   mometasone (ELOCON) 0.1 % ointment Apply  to affected area as needed for Skin Irritation.     Historical Provider         Review of Systems:  (bold if positive, if negative)    Gen:  Eyes:  ENT:  CVS:  Pulm:  GI:    :    MS:  Skin:  Psych:  Endo:    Hem:  Renal:    Neuro:            Objective:      VITALS:    Vital signs reviewed; most recent are:    Visit Vitals    /74    Pulse 78    Temp 97.9 °F (36.6 °C)    Resp 18    Ht 6' 2\" (1.88 m)    Wt 88.9 kg (196 lb)    SpO2 98%    BMI 25.16 kg/m2     SpO2 Readings from Last 6 Encounters:   03/19/18 98%   05/28/17 97%   08/17/16 94%   02/09/16 99%   02/04/16 96%        No intake or output data in the 24 hours ending 03/19/18 1925         Exam:     Physical Exam:    Gen:  Well-developed, well-nourished, in no acute distress  HEENT:  Pink conjunctivae, PERRL, hearing intact to voice, moist mucous membranes  Neck:  Supple, without masses, thyroid non-tender  Resp:  No accessory muscle use, clear breath sounds without wheezes rales or rhonchi  Card:  No murmurs, normal S1, S2 without thrills, bruits or peripheral edema  Abd:  Soft, non-tender, non-distended, normoactive bowel sounds are present, no palpable organomegaly and no detectable hernias  Lymph:  No cervical or inguinal adenopathy  Musc:  No cyanosis or clubbing  Skin:  No rashes or ulcers, skin turgor is good  Neuro:  Cranial nerves are grossly intact, no focal motor weakness, follows commands appropriately  Psych:  Good insight, oriented to person, place and time, alert       Labs:    Recent Labs      03/19/18   1724   WBC  7.9   HGB  13.5   HCT  39.9   PLT  182     Recent Labs      03/19/18   1724   NA  141   K  4.1   CL  104   CO2  25   GLU  103*   BUN  27*   CREA  0.81   CA  8.9   ALB  3.8   TBILI  0.3   SGOT  27   ALT  37     Lab Results   Component Value Date/Time    Glucose (POC) 113 (H) 02/08/2016 01:43 PM    Glucose (POC) 108 (H) 02/08/2016 10:54 AM     No results for input(s): PH, PCO2, PO2, HCO3, FIO2 in the last 72 hours. No results for input(s): INR in the last 72 hours.     No lab exists for component: INREXT, INREXT    Telemetry reviewed:          Assessment/Plan:    1. Transient global amnesia (3/19/2018) vs TIA (transient ischemic attack) (3/19/2018) vs SZD. Now, pt is back to his baseline. No neurological deficit. Admit under observation and check MRI of the alma with and without contrast.  Neuro check. ( spoke on the phone with Dr Alysha Alcala,  neurology). 2.  CECIL (obstructive sleep apnea) (3/19/2018). May use own CPAP. 3.  HTN (hypertension) (3/19/2018). Continue home losartan     4. GERD (gastroesophageal reflux disease) (3/19/2018). On PPI    5. Cervical stenosis of spine (2/8/2016). Continue pain meds    6. DM. Diet controlled. Diabetic diet. Check A1C.        Previous medical records reviewed     Risk of deterioration: high      Total time spent with patient: [address] discussed with: Patient, Family, Nursing Staff and >50% of time spent in counseling and coordination of care    Discussed:  Care Plan    Prophylaxis:  Lovenox    Probable Disposition:  Home w/Family           ___________________________________________________    Attending Physician: Tanna Oquendo MD

## 2018-03-20 VITALS
HEART RATE: 84 BPM | TEMPERATURE: 98.3 F | DIASTOLIC BLOOD PRESSURE: 71 MMHG | RESPIRATION RATE: 14 BRPM | BODY MASS INDEX: 25.15 KG/M2 | WEIGHT: 196 LBS | OXYGEN SATURATION: 98 % | SYSTOLIC BLOOD PRESSURE: 127 MMHG | HEIGHT: 74 IN

## 2018-03-20 PROBLEM — G45.9 TIA (TRANSIENT ISCHEMIC ATTACK): Status: RESOLVED | Noted: 2018-03-19 | Resolved: 2018-03-20

## 2018-03-20 LAB
ATRIAL RATE: 79 BPM
CALCULATED P AXIS, ECG09: 47 DEGREES
CALCULATED R AXIS, ECG10: 7 DEGREES
CALCULATED T AXIS, ECG11: 14 DEGREES
CHOLEST SERPL-MCNC: 151 MG/DL
DIAGNOSIS, 93000: NORMAL
EST. AVERAGE GLUCOSE BLD GHB EST-MCNC: 140 MG/DL
GLUCOSE BLD STRIP.AUTO-MCNC: 108 MG/DL (ref 65–100)
GLUCOSE BLD STRIP.AUTO-MCNC: 131 MG/DL (ref 65–100)
GLUCOSE BLD STRIP.AUTO-MCNC: 94 MG/DL (ref 65–100)
HBA1C MFR BLD: 6.5 % (ref 4.2–6.3)
HDLC SERPL-MCNC: 36 MG/DL
HDLC SERPL: 4.2 {RATIO} (ref 0–5)
LDLC SERPL CALC-MCNC: 80.2 MG/DL (ref 0–100)
LIPID PROFILE,FLP: ABNORMAL
P-R INTERVAL, ECG05: 164 MS
Q-T INTERVAL, ECG07: 388 MS
QRS DURATION, ECG06: 110 MS
QTC CALCULATION (BEZET), ECG08: 444 MS
SERVICE CMNT-IMP: ABNORMAL
SERVICE CMNT-IMP: ABNORMAL
SERVICE CMNT-IMP: NORMAL
TRIGL SERPL-MCNC: 174 MG/DL (ref ?–150)
VENTRICULAR RATE, ECG03: 79 BPM
VLDLC SERPL CALC-MCNC: 34.8 MG/DL

## 2018-03-20 PROCEDURE — 96372 THER/PROPH/DIAG INJ SC/IM: CPT

## 2018-03-20 PROCEDURE — 83036 HEMOGLOBIN GLYCOSYLATED A1C: CPT | Performed by: INTERNAL MEDICINE

## 2018-03-20 PROCEDURE — 93880 EXTRACRANIAL BILAT STUDY: CPT

## 2018-03-20 PROCEDURE — 82962 GLUCOSE BLOOD TEST: CPT

## 2018-03-20 PROCEDURE — 99218 HC RM OBSERVATION: CPT

## 2018-03-20 PROCEDURE — 74011250637 HC RX REV CODE- 250/637: Performed by: NURSE PRACTITIONER

## 2018-03-20 PROCEDURE — 80061 LIPID PANEL: CPT | Performed by: INTERNAL MEDICINE

## 2018-03-20 PROCEDURE — 36415 COLL VENOUS BLD VENIPUNCTURE: CPT | Performed by: INTERNAL MEDICINE

## 2018-03-20 RX ORDER — PANTOPRAZOLE SODIUM 20 MG/1
20 TABLET, DELAYED RELEASE ORAL
Status: DISCONTINUED | OUTPATIENT
Start: 2018-03-21 | End: 2018-03-20

## 2018-03-20 RX ORDER — ASPIRIN 81 MG/1
81 TABLET ORAL
Status: DISCONTINUED | OUTPATIENT
Start: 2018-03-20 | End: 2018-03-20 | Stop reason: HOSPADM

## 2018-03-20 RX ORDER — PANTOPRAZOLE SODIUM 40 MG/1
40 TABLET, DELAYED RELEASE ORAL
Status: DISCONTINUED | OUTPATIENT
Start: 2018-03-21 | End: 2018-03-20 | Stop reason: HOSPADM

## 2018-03-20 RX ORDER — ATORVASTATIN CALCIUM 10 MG/1
40 TABLET, FILM COATED ORAL
Status: DISCONTINUED | OUTPATIENT
Start: 2018-03-20 | End: 2018-03-20

## 2018-03-20 RX ORDER — CLOPIDOGREL BISULFATE 75 MG/1
75 TABLET ORAL DAILY
Status: DISCONTINUED | OUTPATIENT
Start: 2018-03-20 | End: 2018-03-20

## 2018-03-20 RX ORDER — LOSARTAN POTASSIUM 50 MG/1
100 TABLET ORAL
Status: DISCONTINUED | OUTPATIENT
Start: 2018-03-20 | End: 2018-03-20 | Stop reason: HOSPADM

## 2018-03-20 RX ADMIN — CLOPIDOGREL BISULFATE 75 MG: 75 TABLET ORAL at 14:15

## 2018-03-20 RX ADMIN — Medication 10 ML: at 06:10

## 2018-03-20 RX ADMIN — Medication 10 ML: at 14:00

## 2018-03-20 NOTE — PROCEDURES
[address]  *** FINAL REPORT ***    Name: Talbot Aase  MRN: [id number]    Outpatient  : 1945  HIS Order #: [id number]  [address] [facility] Visit #: [id number]  Date: 20 Mar 2018    TYPE OF TEST: Cerebrovascular Duplex    REASON FOR TEST  Transient ischemic attacks    Right Carotid:-             Proximal               Mid                 Distal  cm/s  Systolic  Diastolic  Systolic  Diastolic  Systolic  Diastolic  CCA:    812.2      20.9                           109.5      28.8  Bulb:  ECA:     92.7      13.7  ICA:     68.1      21.5       57.7      20.2       55.1      22.8  ICA/CCA:  0.6       0.7    ICA Stenosis: <50%    Right Vertebral:-  Finding: Antegrade  Sys:       51.2  Romy:       16.3    Right Subclavian:    Left Carotid:-            Proximal                Mid                 Distal  cm/s  Systolic  Diastolic  Systolic  Diastolic  Systolic  Diastolic  CCA:    816.9      22.0                            91.4      23.6  Bulb:  ECA:    114.0      10.7  ICA:     60.0      19.3       75.4      29.2       57.8      21.5  ICA/CCA:  0.7       0.8    ICA Stenosis: <50%    Left Vertebral:-  Finding: Antegrade  Sys:       51.1  Romy:       11.0    Left Subclavian:    INTERPRETATION/FINDINGS  PROCEDURE:  Evaluation of the extracranial cerebrovascular arteries  with ultrasound (B-mode imaging, pulsed Doppler, color Doppler). Includes the common carotid, internal carotid, external carotid, and  vertebral arteries. FINDINGS:  Right side: Mild heterogeneous plaque noted in the distal CCA, bulb  and ICA. Left side: Mild heterogeneous plaque noted in the bulb and ICA. IMPRESSION: Findings are consistent with 0-49% stenosis of the right  internal carotid and 0-49% stenosis of the left internal carotid. Vertebrals are patent with antegrade flow. ADDITIONAL COMMENTS    I have personally reviewed the data relevant to the interpretation of  this  study.     TECHNOLOGIST: Trevor Jones RDCS  Signed: 03/20/2018 11:17 AM    PHYSICIAN: Shell Laguerre.  Hannah Diaz MD  Signed: 03/21/2018 11:11 AM

## 2018-03-20 NOTE — PROGRESS NOTES
Primary Nurse Jacinto Montana and Hilario Elizabeth RN performed a dual skin assessment on this patient No impairment noted  Israel score is 23

## 2018-03-20 NOTE — PROGRESS NOTES
Rayray Almazan tina Coy 81  [address] [facility], [place], [address] [place] [address]  [phone]      Medical Progress Note      NAME: Gio Dyer   :  1945  MRM:  [id number]    Date/Time: 3/20/2018  9:30 AM       Assessment and Plan:     Transient global amnesia - POA, resolved prior to arrival.  Classic symptoms. No sign of TIA or CVA. MRI normal.  Neuoroology consulted. He can go home after they see hime    CECIL (obstructive sleep apnea) - Stable and using home CPAP    HTN (hypertension) - Well controlled on losartan    Cervical stenosis of spine - Takes diazepam prn for spasm, had NOT taken any prior to TGA    GERD (gastroesophageal reflux disease) - No symptoms on PPI       Subjective:     Chief Complaint:  Feels completely normal    ROS:  (bold if positive, if negative)      Tolerating PT  Tolerating Diet        Objective:     Last 24hrs VS reviewed since prior progress note.  Most recent are:    Visit Vitals    /63 (BP 1 Location: Left arm, BP Patient Position: At rest)    Pulse 75    Temp 98.2 °F (36.8 °C)    Resp 10    Ht 6' 2\" (1.88 m)    Wt 88.9 kg (196 lb)    SpO2 95%    BMI 25.16 kg/m2     SpO2 Readings from Last 6 Encounters:   18 95%   17 97%   16 94%   16 99%   16 96%        No intake or output data in the 24 hours ending 18 0933     Physical Exam:    Gen:  Well-developed, well-nourished, in no acute distress  HEENT:  Pink conjunctivae, PERRL, hearing intact to voice, moist mucous membranes  Neck:  Supple, without masses, thyroid non-tender  Resp:  No accessory muscle use, clear breath sounds without wheezes rales or rhonchi  Card:  No murmurs, normal S1, S2 without thrills, bruits or peripheral edema  Abd:  Soft, non-tender, non-distended, normoactive bowel sounds are present, no mass  Lymph:  No cervical or inguinal adenopathy  Musc:  No cyanosis or clubbing  Skin:  No rashes or ulcers, skin turgor is good  Neuro:  Cranial nerves are grossly intact, no focal motor weakness, follows commands appropriately  Psych:  Good insight, oriented to person, place and time, alert    Telemetry reviewed:   normal sinus rhythm  __________________________________________________________________  Medications Reviewed: (see below)  Medications:     Current Facility-Administered Medications   Medication Dose Route Frequency    [START ON 3/21/2018] pantoprazole (PROTONIX) tablet 20 mg  20 mg Oral ACB    losartan (COZAAR) tablet 100 mg  100 mg Oral QHS    aspirin delayed-release tablet 81 mg  81 mg Oral QHS    sodium chloride (NS) flush 5-10 mL  5-10 mL IntraVENous Q8H    sodium chloride (NS) flush 5-10 mL  5-10 mL IntraVENous PRN    acetaminophen (TYLENOL) tablet 650 mg  650 mg Oral Q4H PRN        Lab Data Reviewed: (see below)  Lab Review:     Recent Labs      03/19/18   1724   WBC  7.9   HGB  13.5   HCT  39.9   PLT  182     Recent Labs      03/19/18   1724   NA  141   K  4.1   CL  104   CO2  25   GLU  103*   BUN  27*   CREA  0.81   CA  8.9   ALB  3.8   TBILI  0.3   SGOT  27   ALT  37     Lab Results   Component Value Date/Time    Glucose (POC) 108 (H) 03/20/2018 06:45 AM    Glucose (POC) 161 (H) 03/19/2018 09:41 PM    Glucose (POC) 113 (H) 02/08/2016 01:43 PM    Glucose (POC) 108 (H) 02/08/2016 10:54 AM     No results for input(s): PH, PCO2, PO2, HCO3, FIO2 in the last 72 hours. No results for input(s): INR in the last 72 hours. No lab exists for component: INREXT, INREXT  All Micro Results     None          I have reviewed notes of prior 24hr.     Other pertinent lab: none    Total time spent with patient: [address] [place] [address] discussed with: Patient, Family, Care Manager, Nursing Staff, Consultant/Specialist and >50% of time spent in counseling and coordination of care    Discussed:  Care Plan and D/C Planning    Prophylaxis:  H2B/PPI    Disposition:  Home w/Family           ___________________________________________________    Attending Physician: Darius Samples, MD

## 2018-03-20 NOTE — PROGRESS NOTES
Bedside and Verbal shift change report given to Estephania Patrick (oncoming nurse) by Kwame Cruz (offgoing nurse). Report included the following information SBAR, Kardex, Intake/Output, MAR, Recent Results and Cardiac Rhythm Sinus Rhythm.

## 2018-03-20 NOTE — PROGRESS NOTES
[facility]: MED RECONCILIATION    Comments/Recommendations:    Patient gave consent to perform medication reconciliation while family in the room.  Patient was alert and knowledgeable about his home medications (name, dose, frequency).  Patient did not have any medications today, but did have all maintenance medications yesterday. Medication(s) ADDED to PTA list:  1. ASA 81 mg daily    Medication(s) REMOVED from PTA list:  1. Ketoralac 10 mg    Medication(s) ADJUSTED on PTA list:  1. Losartan dose changed to \"100 mg\" daily (from 25 mg entry)- information reported from patient and spouse confirmation. 2. Fish oil changed to evening administration    Information obtained from: Patient/ Spouse      Allergies: Penicillins and Adderall [dextroamphetamine-amphetamine]    Prior to Admission Medications:     Medication Documentation Review Audit       Reviewed by KAREL BelcherD (Pharmacist) on 03/19/18 at 3642         Medication Sig Documenting Provider Last Dose Status Taking?      aspirin delayed-release 81 mg tablet Take 81 mg by mouth nightly. Historical Provider 3/18/2018 Unknown time Active Yes    azelastine (ASTELIN) 137 mcg (0.1 %) nasal spray 1 [place] by Both Nostrils route as needed for Rhinitis. Use in each nostril as directed Historical Provider  Active Yes    diazepam (VALIUM) 5 mg tablet Take 1 Tab by mouth every eight (8) hours as needed (Spasms). Max Daily Amount: 15 mg. Emilia Blas NP  Active Yes    DOCOSAHEXANOIC ACID/EPA (FISH OIL PO) Take 2 Caps by mouth nightly. Historical Provider 3/18/2018 Unknown time Active Yes    fexofenadine-pseudoephedrine (ALLEGRA-D)  mg Tb12 Take 1 Tab by mouth every twelve (12) hours as needed. Historical Provider  Active Yes    losartan (COZAAR) 100 mg tablet Take 100 mg by mouth nightly. Historical Provider 3/18/2018 pm Active Yes    mometasone (ELOCON) 0.1 % ointment Apply  to affected area as needed for Skin Irritation.  Historical Provider Active Yes    multivitamin (ONE A DAY) tablet Take 1 Tab by mouth daily (after breakfast). Historical Provider 3/18/2018 Unknown time Active Yes             Med Note (Rosaline PARKER   Mon Mar 19, 2018  9:22 PM):        pantoprazole (PROTONIX) 20 mg tablet Take 20 mg by mouth Daily (before breakfast).  Historical Provider 3/18/2018 Unknown time Active Yes             Med Note Alia Jaocbs   Mon Mar 19, 2018  9:23 PM):                            BEULAH Miranda   Contact: [phone]

## 2018-03-20 NOTE — CONSULTS
[facility]: [id number] [address] Neurology  [facility]  [phone]        Name:   Leopold Hoyles   Medical record #: [id number]  Admission Date: 3/19/2018   Who Consulted: Dr. Zach High  Reason for Consult: AMS    HISTORY OF PRESENT ILLNESS   This is a 81 y.o. male with  has a past medical history of Arthritis; Cancer [facility]) (2008); Chronic pain; Diabetes ([address] GERD (gastroesophageal reflux disease); Hypertension; Sleep apnea; Thromboembolus [facility]) (2008); and Transfusion history (2/11/2008). who is admitted for transient global amneia. The Neurology Service is asked to evaluate for TIA. On Sunday, pt was outside cutting down trees and working on his lawn motor. He remembers hooking the battery up to a  around 4pm in his barn and then and going to get his lawn workers money. He then remembers being upstairs in his home office in his chair, but could not remember how much to pay his worker. He knew he needed to pay his worker, but could not remember how much or how to actually pay money. He then went to find his wife to help him, he next remembers sitting on his porch having his BP measured, but does not know the time interval.  He says that he began to remember things again around 7 pm.   He went to work the next day, but his family was concerned about the event so they insisted he visit  His PCP who sent him to the ED. Per Mrs. Chanelle Villa, Mr. Chanelle Villa was working with his lawn worker when [employer] would not start, so Mr. Chanelle Villa went to get his truck to pull his closer to the house to repair it. Around 6pm, Mrs. Chanelle Villa noticed that the lawn worker had been standing in the ruano waiting more than 20 minutes and went to find her . She then went to find her  and found him standing in the middle of their home office with a blank look on his face looking at the floor. She felt like it took him a long time to find the words \"I need your help\".   She said he looked like he was he was in a trance. He did not have a droopy face, was not looking in either direction, he just looked to like he could not put words together. His wife helped him gather the money, and she paid the worker. She then took Mr. Doyle Wade outside where he told her he did not feel well. She checked his BS and BP which were normal.  He began asking her about breakfast and the location of his belongings. He repeatedly asked the same questions. He gradually returned to his baseline after his wife answered his questions repeatedly around 12 MN. He seemed especially tired to her all evening. Both  And Mrs. Doyle Wade deny such an episode ever happening before. He denies a personal or family seizure history. 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 His wife does report that he has had some increasing difficulty with word finding over the last year. Assessment/Plan:   1. Transient Global Amnesia:    · MRI negative for stroke  · Story is not consistent with seizure or post ictal state. Stroke work up  · Last A1C: 6.5  · LDL 80.2, statin increased  · Follow up MRI: No evidence for acute infarction, mass or enhancement abnormality  · Carotid vascular imaging:  Less than 50%    Risk factors for stroke include:  Obesity, HTN, DM, HLD, CECIL  · Discussed with patient    · Discussed signs/symptoms of stroke and when to call 911     3. Mobility:   · Has been OOB. · PT/OT to eval for rehab    4. Diet:    · Does not need SLP     5. VTE Prophylaxes:   · Lovenox 40 mg, SQ daily      Thank you for allowing the Neurology Service the pleasure of participating in the care of your patient. This patient will be discussed with my collaborating care team physician Dr. Diana Razo and he may have further recommendations regarding this patient's care. Attending Attestation:     ==============================================================    Attending Addendum    Reviewed consult note by WINIFRED Smith.   Patient independently interviewed and examined. Reviewed history with patient and wife and agree with hx as detailed by NP Sue Meade. Reviewed MRI Brain, few scattered, non-specific T2 white matter hyper-intensities (mild chronic small vessel disease), no evidence of stroke (remote or recent). Carotid Dopplers w/o any significant narrowing on either side. TTE is normal.        No current facility-administered medications on file prior to encounter. Current Outpatient Prescriptions on File Prior to Encounter   Medication Sig Dispense Refill    diazepam (VALIUM) 5 mg tablet Take 1 Tab by mouth every eight (8) hours as needed (Spasms). Max Daily Amount: 15 mg. 1 Tab 0    pantoprazole (PROTONIX) 20 mg tablet Take 20 mg by mouth Daily (before breakfast).  multivitamin (ONE A DAY) tablet Take 1 Tab by mouth daily (after breakfast).  azelastine (ASTELIN) 137 mcg (0.1 %) nasal spray 1 [place] by Both Nostrils route as needed for Rhinitis. Use in each nostril as directed      fexofenadine-pseudoephedrine (ALLEGRA-D)  mg Tb12 Take 1 Tab by mouth every twelve (12) hours as needed.  mometasone (ELOCON) 0.1 % ointment Apply  to affected area as needed for Skin Irritation. Patient Vitals for the past 8 hrs:   Temp Pulse Resp BP SpO2   03/20/18 1553 98.3 °F (36.8 °C) 84 14 127/71 98 %   03/20/18 1434 - 79 - - -   03/20/18 1210 97.8 °F (36.6 °C) 84 12 118/66 95 %         Exam  General: awake, alert, oriented, conversant  CN: EOMI, Face symmetric, Facial sensation intact bilaterally, Hearing grossly normal, Language normal (no aphasia, no dysarthria), Shrug symmetric  Motor: 5/ 5 in all exts  Sensory: intact LT in all exts  Cerebellar: normal FNF bilaterally  DTRs: 2+ patellars 1+ biceps  Gait: normal    Impression/ Plan    81 y.o. male with approximately 2 hr episode of confusion; no other neurologic symptoms during episode. Blood sugar and BP were normal during episode as well.   Pt has recollection of onset but doesn't recall the remainder of episode. Normal neurologic exam by the time he reached ER. MRI Brain didn't show evidence of stroke. No history of seizure and no description of such. Agree with Dr Mounika Buchanan dx of Transient Global Amnesia  Discussed dx with pt and wife, discussed how this is not a TIA, how recurrence rate is low (2.5 to 5% chance of recurrence), if it recurs look for any stroke signs/ symptoms and if seen then bring to ER immediately. D/w NP Beth Griffiths that pt should continue his ASA regimen and no indication to increase statin dose as LDL is within normal limits as this event was not TIA/ Stroke. Pt cleared for discharge. He will f/u with PCP as instructed by Hospitalist [facility]/ Dr Abril Cool. Thank you for asking the Neurology [facility] to see Mr Olivia Restrepo MD  2018                     Review of Systems: 10 point ROS was performed. Pertinent positives listed in HPI. Negative ROS is as follows. Pt denies: angina, palpitations, paresthesias, weakness, vision loss, slurred speech, aphasia, fever, chills, falls, headache, diplopia, back pain, neck pain, prior episodes of vertigo, hallucinations, new medications or dosage changes. Clinical Data  Imaging review:   3/19/2018 MRI Brain:  No evidence for acute infarction, mass or enhancement abnormality. PHYSICAL EXAM     Visit Vitals    /63 (BP 1 Location: Left arm, BP Patient Position: At rest)    Pulse 75    Temp 98.2 °F (36.8 °C)    Resp 10    Ht 6' 2\" (1.88 m)    Wt 88.9 kg (196 lb)    SpO2 95%    BMI 25.16 kg/m2      O2 Device: Room air    Temp (24hrs), Av.8 °F (36.6 °C), Min:97.5 °F (36.4 °C), Max:98.2 °F (36.8 °C)              General:  Alert, cooperative, no acute distress. Lungs:   Clear to auscultation bilaterally. No crackles/wheezes. Heart:  Abdominal:  Regular rate and rhythm, No murmur, click, rub or gallop.    Soft and nondistended   Skin: Skin color, texture, turgor normal.    NEUROLOGICAL EXAM    Appearance:  Well developed, well nourished,  and is in no acute distress. Mental Status: Oriented to time, place and person. Fully attentive. No aphasia. Full fund of knowledge. Normal recent and remote memory. Cranial Nerves:   Intact visual fields. PERRL, EOM's full, no nystagmus, no ptosis. Facial sensation is normal. Facial movement is symmetric. Palate is midline. Normal sternocleidomastoid strength. Tongue is midline. Reflexes:   Deep tendon reflexes 2+/4 and symmetrical.   Sensory:   Normal to temperature and vibration. Gait:  Normal gait. Tremor:   No tremor noted. Cerebellar:  No cerebellar signs present. Neurovascular:  Normal heart sounds and regular rhythm. No carotid bruits. Motor: No pronator drift of either outstretched arm. Deltoid Biceps Triceps Wrist Extension Finger Abduction   L 5 5 5 5 5   R 5 5 5 5 5      Hip Flexion Hip Extension Knee Flexion Knee Extension Ankle Dorsiflexion Ankle Plantarflexion   L 5 5 5 5 5 5   R 5 5 5 5 5 5        Reflexes:     Biceps Triceps Plantar Patellar Achilles   L 2 2 2 2 2   R 2 2 2 2 2      NIH Stroke Score  Total: 0 (03/19/18 1736)     History  Past Medical History:   Diagnosis Date    Arthritis     Spine and knees    Cancer ([address] 2008    Prostate    Chronic pain     Diabetes ([address]     controoled with diet and lost weight    GERD (gastroesophageal reflux disease)     Hypertension     Sleep apnea     compliant with c-pap    Thromboembolus ([address] 2008    DVT in one leg after Bilateral Knee replacement but doesn't remember which leg.     Transfusion history 2/11/2008    required 2units RBC      Past Surgical History:   Procedure Laterality Date    HX CERVICAL FUSION      HX HERNIA REPAIR Right 1983    Inguinal hernia repair    HX ORTHOPAEDIC Right 1987    right knee arthroscopic    HX ORTHOPAEDIC Right 1986    right wrist arthroscopic surgery    HX ORTHOPAEDIC Bilateral 2008    Knee arthroplasty    HX ORTHOPAEDIC Right 2014    index finger 1st joint cyst removed    HX PROSTATECTOMY  2008     Family History   Problem Relation Age of Onset    Diabetes Mother     Alzheimer Father      Social History     Social History    Marital status:      Spouse name: N/A    Number of children: N/A    Years of education: N/A     Occupational History    Not on file. Social History Main Topics    Smoking status: Former Smoker     Packs/day: 0.00     Quit date: 2/4/1996    Smokeless tobacco: Never Used      Comment: occ cigars years ago Quit; smoked pipes    Alcohol use No    Drug use: No    Sexual activity: Not on file     Other Topics Concern    Not on file     Social History Narrative       Allergies   Allergies   Allergen Reactions    Penicillins Hives     Childhood    Adderall [Dextroamphetamine-Amphetamine] Other (comments)     Mouth sores and tongue swelling       Outpatient Meds  No current facility-administered medications on file prior to encounter. Current Outpatient Prescriptions on File Prior to Encounter   Medication Sig Dispense Refill    diazepam (VALIUM) 5 mg tablet Take 1 Tab by mouth every eight (8) hours as needed (Spasms). Max Daily Amount: 15 mg. 1 Tab 0    pantoprazole (PROTONIX) 20 mg tablet Take 20 mg by mouth Daily (before breakfast).  multivitamin (ONE A DAY) tablet Take 1 Tab by mouth daily (after breakfast).  azelastine (ASTELIN) 137 mcg (0.1 %) nasal spray 1 [place] by Both Nostrils route as needed for Rhinitis. Use in each nostril as directed      fexofenadine-pseudoephedrine (ALLEGRA-D)  mg Tb12 Take 1 Tab by mouth every twelve (12) hours as needed.  mometasone (ELOCON) 0.1 % ointment Apply  to affected area as needed for Skin Irritation.          Inpatient Meds    Current Facility-Administered Medications:     losartan (COZAAR) tablet 100 mg, 100 mg, Oral, QHS, Bhanu Restrepo MD    aspirin delayed-release tablet 81 mg, 81 mg, Oral, QHS, Peyton Portillo MD    [START ON 3/21/2018] pantoprazole (PROTONIX) tablet 40 mg, 40 mg, Oral, ACB, Tomy Dimas MD    sodium chloride (NS) flush 5-10 mL, 5-10 mL, IntraVENous, Q8H, Tomy Dimas MD, 10 mL at 03/20/18 1400    sodium chloride (NS) flush 5-10 mL, 5-10 mL, IntraVENous, PRN, Tomy Dimas MD    acetaminophen (TYLENOL) tablet 650 mg, 650 mg, Oral, Q4H PRN **OR** [DISCONTINUED] acetaminophen (TYLENOL) solution 650 mg, 650 mg, Per NG tube, Q4H PRN **OR** [DISCONTINUED] acetaminophen (TYLENOL) suppository 650 mg, 650 mg, Rectal, Q4H PRN, Tomy Dimas MD        Recent Results (from the past 24 hour(s))   METABOLIC PANEL, COMPREHENSIVE    Collection Time: 03/19/18  5:24 PM   Result Value Ref Range    Sodium 141 136 - 145 mmol/L    Potassium 4.1 3.5 - 5.1 mmol/L    Chloride 104 97 - 108 mmol/L    CO2 25 21 - 32 mmol/L    Anion gap 12 5 - 15 mmol/L    Glucose 103 (H) 65 - 100 mg/dL    BUN 27 (H) 6 - 20 MG/DL    Creatinine 0.81 0.70 - 1.30 MG/DL    BUN/Creatinine ratio 33 (H) 12 - 20      GFR est AA >60 >60 ml/min/1.73m2    GFR est non-AA >60 >60 ml/min/1.73m2    Calcium 8.9 8.5 - 10.1 MG/DL    Bilirubin, total 0.3 0.2 - 1.0 MG/DL    ALT (SGPT) 37 12 - 78 U/L    AST (SGOT) 27 15 - 37 U/L    Alk. phosphatase 80 45 - 117 U/L    Protein, total 7.5 6.4 - 8.2 g/dL    Albumin 3.8 3.5 - 5.0 g/dL    Globulin 3.7 2.0 - 4.0 g/dL    A-G Ratio 1.0 (L) 1.1 - 2.2     CBC WITH AUTOMATED DIFF    Collection Time: 03/19/18  5:24 PM   Result Value Ref Range    WBC 7.9 4.1 - 11.1 K/uL    RBC 4.59 4. 10 - 5.70 M/uL    HGB 13.5 12.1 - 17.0 g/dL    HCT 39.9 36.6 - 50.3 %    MCV 86.9 80.0 - 99.0 FL    MCH 29.4 26.0 - 34.0 PG    MCHC 33.8 30.0 - 36.5 g/dL    RDW 13.9 11.5 - 14.5 %    PLATELET 928 315 - 413 K/uL    MPV 11.3 8.9 - 12.9 FL    NRBC 0.0 0  WBC    ABSOLUTE NRBC 0.00 0.00 - 0.01 K/uL    NEUTROPHILS 61 32 - 75 %    LYMPHOCYTES 25 12 - 49 %    MONOCYTES 8 5 - 13 %    EOSINOPHILS 5 0 - 7 % BASOPHILS 1 0 - 1 %    IMMATURE GRANULOCYTES 0 0.0 - 0.5 %    ABS. NEUTROPHILS 4.8 1.8 - 8.0 K/UL    ABS. LYMPHOCYTES 1.9 0.8 - 3.5 K/UL    ABS. MONOCYTES 0.7 0.0 - 1.0 K/UL    ABS. EOSINOPHILS 0.4 0.0 - 0.4 K/UL    ABS. BASOPHILS 0.1 0.0 - 0.1 K/UL    ABS. IMM.  GRANS. 0.0 0.00 - 0.04 K/UL    DF AUTOMATED     EKG, 12 LEAD, INITIAL    Collection Time: 03/19/18  5:26 PM   Result Value Ref Range    Ventricular Rate 79 BPM    Atrial Rate 79 BPM    P-R Interval 164 ms    QRS Duration 110 ms    Q-T Interval 388 ms    QTC Calculation (Bezet) 444 ms    Calculated P Axis 47 degrees    Calculated R Axis 7 degrees    Calculated T Axis 14 degrees    Diagnosis       Normal sinus rhythm  Normal ekg  When compared with ECG of  4-Feb-2016  No significant change    Confirmed by Tyrese Gallegos MD ([id number]) on 3/20/2018 8:37:09 AM     GLUCOSE, POC    Collection Time: 03/19/18  9:41 PM   Result Value Ref Range    Glucose (POC) 161 (H) 65 - 100 mg/dL    Performed by Wilbert Bartholomew    LIPID PANEL    Collection Time: 03/20/18  2:26 AM   Result Value Ref Range    LIPID PROFILE          Cholesterol, total 151 <200 MG/DL    Triglyceride 174 (H) <150 MG/DL    HDL Cholesterol 36 MG/DL    LDL, calculated 80.2 0 - 100 MG/DL    VLDL, calculated 34.8 MG/DL    CHOL/HDL Ratio 4.2 0 - 5.0     HEMOGLOBIN A1C WITH EAG    Collection Time: 03/20/18  2:26 AM   Result Value Ref Range    Hemoglobin A1c 6.5 (H) 4.2 - 6.3 %    Est. average glucose 140 mg/dL   GLUCOSE, POC    Collection Time: 03/20/18  6:45 AM   Result Value Ref Range    Glucose (POC) 108 (H) 65 - 100 mg/dL    Performed by Milvia THOMAS)    GLUCOSE, POC    Collection Time: 03/20/18 11:36 AM   Result Value Ref Range    Glucose (POC) 94 65 - 100 mg/dL    Performed by Kim Romo, POC    Collection Time: 03/20/18  5:19 PM   Result Value Ref Range    Glucose (POC) 131 (H) 65 - 100 mg/dL    Performed by Ivania Dickens discussed with:  Patient x   Family x   RN    Care Manager    Consultant/Specialist:       Cookie Rudolph, ACNP-BC

## 2018-03-20 NOTE — ED NOTES
TRANSFER - OUT REPORT:    Verbal report given to Fleet Stade (name) on [facility]  being transferred to [facility](unit) for routine progression of care       Report consisted of patients Situation, Background, Assessment and   Recommendations(SBAR). Information from the following report(s) SBAR, Kardex, ED Summary, [facility] and Recent Results was reviewed with the receiving nurse. Lines:   Peripheral IV 03/19/18 Right Forearm (Active)   Site Assessment Clean, dry, & intact 3/19/2018 11:22 PM   Phlebitis Assessment 0 3/19/2018 11:22 PM   Infiltration Assessment 0 3/19/2018 11:22 PM   Dressing Status Clean, dry, & intact 3/19/2018 11:22 PM   Dressing Type Transparent;Tape 3/19/2018 11:22 PM   Hub Color/Line Status Pink;Flushed;Capped 3/19/2018 11:22 PM   Action Taken Blood drawn 3/19/2018  5:25 PM   Alcohol Cap Used Yes 3/19/2018 11:22 PM        Opportunity for questions and clarification was provided.       Patient transported with:   Monitor and RN

## 2018-03-20 NOTE — ED NOTES
Dysphasia screen completed with no abnormal findings. Patients family brought food from home and he is eating at this time.

## 2018-03-20 NOTE — ROUTINE PROCESS
1805: I have reviewed discharge instructions with the patient and spouse. The patient and spouse verbalized understanding. VSS; patient took all belongings with him.

## 2018-03-20 NOTE — DISCHARGE INSTRUCTIONS
Patient Discharge Instructions    Troy Gil / [id number] : 1945    Admitted 3/19/2018 Discharged: 3/20/2018     Primary Diagnoses  Problem List as of 3/20/2018  Date Reviewed: 3/19/2018          Codes Class Noted - Resolved   CECIL (obstructive sleep apnea)   GERD (gastroesophageal reflux disease)   HTN (hypertension)   * (Principal)Transient global amnesia   Cervical stenosis of spine   Spinal stenosis          Take Home Medications     · It is important that you take the medication exactly as they are prescribed. · Keep your medication in the bottles provided by the pharmacist and keep a list of the medication names, dosages, and times to be taken in your wallet. · Do not take other medications without consulting your doctor. What to do at Home    Recommended diet: Cardiac Diet and Low fat, Low cholesterol    Recommended activity: Activity as tolerated    If you experience worse symptoms, please follow up with Dr Tonio Ponce in neurology. Follow-up with your PCP in a few weeks     Transient Global Amnesia: Care Instructions  Your Care Instructions    Transient global amnesia is a rare type of amnesia that causes sudden memory loss. When this happens you cannot remember events from your recent past or make new memories. You may also not know where you are, why you are there, or what the date is. You may even ask the same question many times. Unlike other types of amnesia, you do know who you are and you can recognize people that you know. An episode usually does not last more than 6 hours and it rarely happens again. What causes transient global amnesia is not fully known. But, in some cases, migraines, an intense workout, sex, or stress may cause an episode. Your doctor probably did an exam and ran some tests to rule out certain health problems that can also cause sudden memory loss, such as a stroke, brain tumor, seizure, head injury, or an infection.  If your doctor did not find any of these things to be the cause of your memory loss, you will not need treatment and you can go back to your usual activities. Although you may never be able to remember what happened right before or during the episode, the rest of your memory should come back. Follow-up care is a key part of your treatment and safety. Be sure to make and go to all appointments, and call your doctor if you are having problems. It is also a good idea to know your test results and keep a list of the medicines you take. How can you care for yourself at home? · Take good care of yourself. Eat a balanced, low-fat diet. Get plenty of rest. Limit how much alcohol you drink. Do not smoke. If you need help quitting, talk to your doctor about stop-smoking programs and medicines. These can increase your chances of quitting for good. · Find healthy ways to deal with stress. Regular exercise is one of the best ways to manage stress. · Talk to a counselor if you're concerned about what happened. When should you call for help? Call 911 anytime you think you may need emergency care. For example, call if:  ? · You passed out (lost consciousness). ? · You have symptoms of a stroke. These may include:  ¨ Sudden numbness, tingling, weakness, or loss of movement in your face, arm, or leg, especially on only one side of your body. ¨ Sudden vision changes. ¨ Sudden trouble speaking. ¨ Sudden confusion or trouble understanding simple statements. ¨ Sudden problems with walking or balance. ¨ A sudden, severe headache that is different from past headaches. ? · You develop a fever with a stiff neck or a severe headache. ? · You do not know who you are or where you are. ?Call your doctor now or seek immediate medical care if:  ? · You suddenly lose your memory again. ? · You have a seizure. ? · You are dizzy. ? · You are more confused, forgetful, or upset than usual.   ? · You notice changes in your behavior or personality.    ? · You begin to have trouble with familiar things, such as how to read or how to tell time. ? Watch closely for changes in your health, and be sure to contact your doctor if you have any problems. Where can you learn more? Go to http://pearl-eduardo.info/. Enter G183 in the search box to learn more about \"Transient Global Amnesia: Care Instructions. \"  Current as of: October 14, 2016  Content Version: 11.4  © 8304-5327 [employer]. Care instructions adapted under license by [employer] (which disclaims liability or warranty for this information). If you have questions about a medical condition or this instruction, always ask your healthcare professional. [place] [address] any warranty or liability for your use of this information. Information obtained by :  I understand that if any problems occur once I am at home I am to contact my physician. I understand and acknowledge receipt of the instructions indicated above.                                                                                                                                            Physician's or R.N.'s Signature                                                                  Date/Time                                                                                                                                              Patient or Representative Signature                                                          Date/Time

## 2018-03-20 NOTE — PROGRESS NOTES
Spoke with patient's wife while he was at a test.  She reports that on SUnday, he had 5-10 min when he could not talk to her. Then he started asking repetitive questions about what he had done that day. No additional aphasia since that  Time and no dysphagia. They waited until Monday and went to family MD, who referred them to ER for w/u for CVA. Educated wife about speech and swallowing being the first signs of CVA (BFASt). RN reports that he is eating well without s/s aspiration. He passed the STAND. They are awaiting neuro eval for discharge. Will defer SLP eval at this time, since no speech-language, swallowing issues for 48 hours.

## 2018-03-20 NOTE — DISCHARGE SUMMARY
Physician Discharge Summary     Patient ID:  Desmond Singh  [id number]  68 y.o.  1945    Admit date: 3/19/2018    Discharge date and time: 3/20/2018    Admission Diagnoses: TIA (transient ischemic attack)  Transient global amnesia    Discharge Diagnoses:    Principal Diagnosis   Transient global amnesia                                             Other Diagnoses    Cervical stenosis of spine (2/8/2016)    CECIL (obstructive sleep apnea) (3/19/2018)    GERD (gastroesophageal reflux disease) (3/19/2018)    HTN (hypertension) (3/19/2018)       Hospital Course:   Transient global amnesia - POA, resolved prior to arrival.  Classic symptoms. No sign of TIA or CVA. MRI normal.  Neuoroology consulted. He can go home after they see him. He is already on ASA. Lipid panel fine.     CECIL (obstructive sleep apnea) - Stable and using home CPAP     HTN (hypertension) - Well controlled on losartan     Cervical stenosis of spine - Takes diazepam prn for spasm, had NOT taken any prior to TGA     GERD (gastroesophageal reflux disease) - No symptoms on PPI    PCP: Diane Valenzuela MD    Consults: Neurology    Significant Diagnostic Studies: See Hospital Course    Discharged home in improved condition.     Discharge Exam:   /63 (BP 1 Location: Left arm, BP Patient Position: At rest)    Pulse 75    Temp 98.2 °F (36.8 °C)    Resp 10    Ht 6' 2\" (1.88 m)    Wt 88.9 kg (196 lb)    SpO2 95%    BMI 25.16 kg/m2      Gen:  Well-developed, well-nourished, in no acute distress  HEENT:  Pink conjunctivae, PERRL, hearing intact to voice, moist mucous membranes  Neck:  Supple, without masses, thyroid non-tender  Resp:  No accessory muscle use, clear breath sounds without wheezes rales or rhonchi  Card:  No murmurs, normal S1, S2 without thrills, bruits or peripheral edema  Abd:  Soft, non-tender, non-distended, normoactive bowel sounds are present, no mass  Lymph:  No cervical or inguinal adenopathy  Musc:  No cyanosis or clubbing  Skin:  No rashes or ulcers, skin turgor is good  Neuro:  Cranial nerves are grossly intact, no focal motor weakness, follows commands appropriately  Psych:  Good insight, oriented to person, place and time, alert    Patient Instructions:   Current Discharge Medication List      CONTINUE these medications which have NOT CHANGED    Details   losartan (COZAAR) 100 mg tablet Take 100 mg by mouth nightly. DOCOSAHEXANOIC ACID/EPA (FISH OIL PO) Take 2 Caps by mouth nightly. aspirin delayed-release 81 mg tablet Take 81 mg by mouth nightly. pantoprazole (PROTONIX) 20 mg tablet Take 20 mg by mouth Daily (before breakfast). multivitamin (ONE A DAY) tablet Take 1 Tab by mouth daily (after breakfast). azelastine (ASTELIN) 137 mcg (0.1 %) nasal spray 1 [place] by Both Nostrils route as needed for Rhinitis. Use in each nostril as directed      fexofenadine-pseudoephedrine (ALLEGRA-D)  mg Tb12 Take 1 Tab by mouth every twelve (12) hours as needed. mometasone (ELOCON) 0.1 % ointment Apply  to affected area as needed for Skin Irritation. diazepam (VALIUM) 5 mg tablet Comments:   Reason for Stopping:             Activity: Activity as tolerated  Diet: Cardiac Diet  Wound Care: None needed    Follow-up with your PCP in a few weeks.   Follow-up tests/labs - none    Signed:  Blayne Yan MD  3/20/2018  9:35 AM

## 2018-03-20 NOTE — PROGRESS NOTES
Verbal shift change report given to Constance Lema (oncoming nurse) by Giovani Shane (offgoing nurse). Report included the following information ED Summary, Intake/Output, MAR and Recent Results.

## 2018-03-20 NOTE — PROGRESS NOTES
3- CASE MANAGEMENT NOTE:  I met with the pt and his wife, Chris Mckinney ([phone]), to determine potential discharge needs. They live in a 2-story house with a first floor bedroom available if needed. He is independent with his ADL's, has no assistive devices, works full-time and drives. His PCP is Dr. Sherri Shook. He has prescription drug coverage and gets his medications from [facility] in [place]. Observation status was explained to them, he signed the Medicare and [facility], was given copies and the originals were placed on his chart. He is not anticipating any discharge needs and his wife will drive him home. Care Management Interventions  PCP Verified by CM:  Yes (Dr. Sherri Shook)  Discharge Durable Medical Equipment: No  Physical Therapy Consult: No  Occupational Therapy Consult: No  Speech Therapy Consult: No  Current Support Network: Lives with Spouse, Own Home  Confirm Follow Up Transport: Family  Plan discussed with Pt/Family/Caregiver: Yes  Discharge Location  Discharge Placement:  (Home with wife)    Whit Lyons, [address], CM

## 2018-04-05 ENCOUNTER — HOSPITAL ENCOUNTER (OUTPATIENT)
Dept: PREADMISSION TESTING | Age: 73
Discharge: HOME | End: 2018-04-05
Payer: MEDICARE

## 2018-04-05 VITALS
SYSTOLIC BLOOD PRESSURE: 115 MMHG | HEIGHT: 74 IN | DIASTOLIC BLOOD PRESSURE: 66 MMHG | TEMPERATURE: 97.7 F | RESPIRATION RATE: 20 BRPM | BODY MASS INDEX: 25.59 KG/M2 | OXYGEN SATURATION: 94 % | HEART RATE: 81 BPM | WEIGHT: 199.44 LBS

## 2018-04-05 LAB
ANION GAP SERPL CALC-SCNC: 9 MMOL/L (ref 5–15)
BUN SERPL-MCNC: 24 MG/DL (ref 6–20)
BUN/CREAT SERPL: 27 (ref 12–20)
CALCIUM SERPL-MCNC: 9.1 MG/DL (ref 8.5–10.1)
CHLORIDE SERPL-SCNC: 105 MMOL/L (ref 97–108)
CO2 SERPL-SCNC: 28 MMOL/L (ref 21–32)
CREAT SERPL-MCNC: 0.88 MG/DL (ref 0.7–1.3)
GLUCOSE SERPL-MCNC: 129 MG/DL (ref 65–100)
POTASSIUM SERPL-SCNC: 4.5 MMOL/L (ref 3.5–5.1)
SODIUM SERPL-SCNC: 142 MMOL/L (ref 136–145)

## 2018-04-05 PROCEDURE — 36415 COLL VENOUS BLD VENIPUNCTURE: CPT | Performed by: SURGERY

## 2018-04-05 PROCEDURE — 80048 BASIC METABOLIC PNL TOTAL CA: CPT | Performed by: SURGERY

## 2018-04-05 RX ORDER — ERGOCALCIFEROL 1.25 MG/1
50000 CAPSULE ORAL
COMMUNITY

## 2018-04-05 NOTE — PERIOP NOTES
[facility]  PREOPERATIVE INSTRUCTIONS    Surgery Date:   4/12/18      Surgery arrival time given by surgeon: NO  (If [facility] staff will call you between 3pm - 7pm the day before surgery with your arrival time. If your surgery is on a Monday, we will call you the preceding Friday. Please call [phone] after 7pm if you did not receive your arrival time.)  1. Report  to the 2nd Floor Admitting Desk on the day of your surgery. Bring your insurance card, photo identification, and any copayment (if applicable). 2. You must have a responsible adult to drive you home and stay with you the first 24 hours after surgery if you are going home the same day of your surgery. 3. Nothing to eat or drink after midnight the night before surgery. This means NO water, gum, mints, coffee, juice, etc.    4. MEDICATIONS TO TAKE THE MORNING OF SURGERY WITH A SIP OF WATER:Protonix  5. No alcoholic beverages 24 hours before and after your surgery. 6. If you are being admitted to the hospital,please leave personal belongings/luggage in your car until you have an assigned hospital room number. ( The hospital discharge time is 12 PM NOON. Your adult  should be at the hospital prior to the noon discharge time unless otherwise instructed.)   7. STOP Aspirin and/or any non-steroidal anti-inflammatory drugs (i.e. Ibuprofen, Naproxen, Advil, Aleve) as directed by your surgeon. You may take Tylenol. Stop herbal supplements 1 week prior to  surgery. 8. If you are currently taking Plavix, Coumadin,or any other blood-thinning/ anticoagulant medication contact your surgeon for instructions. 9. Wear comfortable clothes. Wear your glasses instead of contacts. Please leave all money, jewelry and valuables at home. No make up, particularly mascara, the day of surgery. 10.  REMOVE ALL body piercings, rings,and jewelry and leave at home. Wear your hair loose or down, no pony-tails, buns, or any metal hair clips.    11. If you shower the morning of surgery, please do not apply any lotions, powders, or deodorants afterwards. Do not shave any body area within 24 hours of your surgery. 12. Please follow all instructions to avoid any potential surgical cancellation. 13. Should your physical condition change, (i.e. fever, cold, flu, etc.) please notify your surgeon as soon as possible. 14. It is important to be on time. If a situation occurs where you may be delayed, please call:  [phone] /  on the day of surgery. 15. The Preadmission Testing staff can be reached at 21 [phone]. 16. Special instructions: bring CPAP,Bring completed PTA medication list with you on the day of your surgery. free  parking  17. The patient was contacted  in person. He  verbalize  understanding of all instructions does not  need reinforcement.

## 2018-04-11 ENCOUNTER — ANESTHESIA EVENT (OUTPATIENT)
Dept: SURGERY | Age: 73
End: 2018-04-11
Payer: MEDICARE

## 2018-04-11 RX ORDER — HYDROMORPHONE HYDROCHLORIDE 1 MG/ML
0.5 INJECTION, SOLUTION INTRAMUSCULAR; INTRAVENOUS; SUBCUTANEOUS
Status: CANCELLED | OUTPATIENT
Start: 2018-04-11 | End: 2018-04-11

## 2018-04-11 RX ORDER — DIPHENHYDRAMINE HYDROCHLORIDE 50 MG/ML
12.5 INJECTION, SOLUTION INTRAMUSCULAR; INTRAVENOUS AS NEEDED
Status: CANCELLED | OUTPATIENT
Start: 2018-04-11 | End: 2018-04-11

## 2018-04-11 RX ORDER — ONDANSETRON 2 MG/ML
4 INJECTION INTRAMUSCULAR; INTRAVENOUS AS NEEDED
Status: CANCELLED | OUTPATIENT
Start: 2018-04-11

## 2018-04-11 RX ORDER — SODIUM CHLORIDE, SODIUM LACTATE, POTASSIUM CHLORIDE, CALCIUM CHLORIDE 600; 310; 30; 20 MG/100ML; MG/100ML; MG/100ML; MG/100ML
125 INJECTION, SOLUTION INTRAVENOUS CONTINUOUS
Status: CANCELLED | OUTPATIENT
Start: 2018-04-11

## 2018-04-11 RX ORDER — ALBUTEROL SULFATE 0.83 MG/ML
2.5 SOLUTION RESPIRATORY (INHALATION) AS NEEDED
Status: CANCELLED | OUTPATIENT
Start: 2018-04-11

## 2018-04-12 ENCOUNTER — HOSPITAL ENCOUNTER (OUTPATIENT)
Age: 73
Setting detail: OUTPATIENT SURGERY
Discharge: HOME | End: 2018-04-12
Attending: SURGERY | Admitting: SURGERY
Payer: MEDICARE

## 2018-04-12 ENCOUNTER — ANESTHESIA (OUTPATIENT)
Dept: SURGERY | Age: 73
End: 2018-04-12
Payer: MEDICARE

## 2018-04-12 VITALS
HEART RATE: 60 BPM | TEMPERATURE: 97.6 F | DIASTOLIC BLOOD PRESSURE: 64 MMHG | SYSTOLIC BLOOD PRESSURE: 115 MMHG | OXYGEN SATURATION: 99 % | RESPIRATION RATE: 13 BRPM

## 2018-04-12 DIAGNOSIS — K43.9 VENTRAL HERNIA WITHOUT OBSTRUCTION OR GANGRENE: Primary | ICD-10-CM

## 2018-04-12 LAB
GLUCOSE BLD STRIP.AUTO-MCNC: 117 MG/DL (ref 65–100)
GLUCOSE BLD STRIP.AUTO-MCNC: 129 MG/DL (ref 65–100)
SERVICE CMNT-IMP: ABNORMAL
SERVICE CMNT-IMP: ABNORMAL

## 2018-04-12 PROCEDURE — 77030002933 HC SUT MCRYL J&J -A: Performed by: SURGERY

## 2018-04-12 PROCEDURE — 77030020747 HC TU INSUF ENDOSC TELE -A: Performed by: SURGERY

## 2018-04-12 PROCEDURE — 77030020263 HC SOL INJ SOD CL0.9% LFCR 1000ML: Performed by: SURGERY

## 2018-04-12 PROCEDURE — 77030035048 HC TRCR ENDOSC OPTCL COVD -B: Performed by: SURGERY

## 2018-04-12 PROCEDURE — C9290 INJ, BUPIVACAINE LIPOSOME: HCPCS | Performed by: SURGERY

## 2018-04-12 PROCEDURE — 74011250636 HC RX REV CODE- 250/636

## 2018-04-12 PROCEDURE — 77030013079 HC BLNKT BAIR HGGR 3M -A: Performed by: ANESTHESIOLOGY

## 2018-04-12 PROCEDURE — C1781 MESH (IMPLANTABLE): HCPCS | Performed by: SURGERY

## 2018-04-12 PROCEDURE — 77030018836 HC SOL IRR NACL ICUM -A: Performed by: SURGERY

## 2018-04-12 PROCEDURE — 74011250636 HC RX REV CODE- 250/636: Performed by: SURGERY

## 2018-04-12 PROCEDURE — 74011000250 HC RX REV CODE- 250

## 2018-04-12 PROCEDURE — 77030032490 HC SLV COMPR SCD KNE COVD -B

## 2018-04-12 PROCEDURE — 76210000006 HC OR PH I REC 0.5 TO 1 HR: Performed by: SURGERY

## 2018-04-12 PROCEDURE — 77030032490 HC SLV COMPR SCD KNE COVD -B: Performed by: SURGERY

## 2018-04-12 PROCEDURE — 77030020782 HC GWN BAIR PAWS FLX 3M -B

## 2018-04-12 PROCEDURE — 74011000250 HC RX REV CODE- 250: Performed by: SURGERY

## 2018-04-12 PROCEDURE — 77030037032 HC INSRT SCIS CLICKLLINE DISP STOR -B: Performed by: SURGERY

## 2018-04-12 PROCEDURE — 82962 GLUCOSE BLOOD TEST: CPT

## 2018-04-12 PROCEDURE — 77030018673: Performed by: SURGERY

## 2018-04-12 PROCEDURE — 77030019908 HC STETH ESOPH SIMS -A: Performed by: ANESTHESIOLOGY

## 2018-04-12 PROCEDURE — 76010000149 HC OR TIME 1 TO 1.5 HR: Performed by: SURGERY

## 2018-04-12 PROCEDURE — 77030035051: Performed by: SURGERY

## 2018-04-12 PROCEDURE — 77030025927 HC STPLR FIX SECURSTRP J&J -F: Performed by: SURGERY

## 2018-04-12 PROCEDURE — 77030035043 HC TRCR ENDOSC OPTCL BLDLSS COVD -B: Performed by: SURGERY

## 2018-04-12 PROCEDURE — 77030008684 HC TU ET CUF COVD -B: Performed by: ANESTHESIOLOGY

## 2018-04-12 PROCEDURE — 76060000033 HC ANESTHESIA 1 TO 1.5 HR: Performed by: SURGERY

## 2018-04-12 PROCEDURE — 77030008771 HC TU NG SALEM SUMP -A: Performed by: ANESTHESIOLOGY

## 2018-04-12 PROCEDURE — 77030026438 HC STYL ET INTUB CARD -A: Performed by: ANESTHESIOLOGY

## 2018-04-12 PROCEDURE — 76210000021 HC REC RM PH II 0.5 TO 1 HR: Performed by: SURGERY

## 2018-04-12 PROCEDURE — 77030031139 HC SUT VCRL2 J&J -A: Performed by: SURGERY

## 2018-04-12 PROCEDURE — 74011250636 HC RX REV CODE- 250/636: Performed by: ANESTHESIOLOGY

## 2018-04-12 PROCEDURE — 77030011640 HC PAD GRND REM COVD -A: Performed by: SURGERY

## 2018-04-12 DEVICE — MESH SURG W4XL6IN ELLIPSE SEPRA TECHNOLOGY VENTRALIGHT: Type: IMPLANTABLE DEVICE | Site: ABDOMEN | Status: FUNCTIONAL

## 2018-04-12 RX ORDER — FENTANYL CITRATE 50 UG/ML
INJECTION, SOLUTION INTRAMUSCULAR; INTRAVENOUS AS NEEDED
Status: DISCONTINUED | OUTPATIENT
Start: 2018-04-12 | End: 2018-04-12 | Stop reason: HOSPADM

## 2018-04-12 RX ORDER — SODIUM CHLORIDE, SODIUM LACTATE, POTASSIUM CHLORIDE, CALCIUM CHLORIDE 600; 310; 30; 20 MG/100ML; MG/100ML; MG/100ML; MG/100ML
150 INJECTION, SOLUTION INTRAVENOUS CONTINUOUS
Status: DISCONTINUED | OUTPATIENT
Start: 2018-04-12 | End: 2018-04-12 | Stop reason: HOSPADM

## 2018-04-12 RX ORDER — NEOSTIGMINE METHYLSULFATE 1 MG/ML
INJECTION INTRAVENOUS AS NEEDED
Status: DISCONTINUED | OUTPATIENT
Start: 2018-04-12 | End: 2018-04-12 | Stop reason: HOSPADM

## 2018-04-12 RX ORDER — GLYCOPYRROLATE 0.2 MG/ML
INJECTION INTRAMUSCULAR; INTRAVENOUS AS NEEDED
Status: DISCONTINUED | OUTPATIENT
Start: 2018-04-12 | End: 2018-04-12 | Stop reason: HOSPADM

## 2018-04-12 RX ORDER — POLYETHYLENE GLYCOL 3350 17 G/17G
17 POWDER, FOR SOLUTION ORAL DAILY
Qty: 7 PACKET | Refills: 0 | Status: SHIPPED | OUTPATIENT
Start: 2018-04-12 | End: 2019-03-21 | Stop reason: ALTCHOICE

## 2018-04-12 RX ORDER — MIDAZOLAM HYDROCHLORIDE 1 MG/ML
INJECTION, SOLUTION INTRAMUSCULAR; INTRAVENOUS AS NEEDED
Status: DISCONTINUED | OUTPATIENT
Start: 2018-04-12 | End: 2018-04-12 | Stop reason: HOSPADM

## 2018-04-12 RX ORDER — OXYCODONE AND ACETAMINOPHEN 5; 325 MG/1; MG/1
1-2 TABLET ORAL
Qty: 40 TAB | Refills: 0 | Status: SHIPPED | OUTPATIENT
Start: 2018-04-12 | End: 2019-03-21 | Stop reason: ALTCHOICE

## 2018-04-12 RX ORDER — PROPOFOL 10 MG/ML
INJECTION, EMULSION INTRAVENOUS AS NEEDED
Status: DISCONTINUED | OUTPATIENT
Start: 2018-04-12 | End: 2018-04-12 | Stop reason: HOSPADM

## 2018-04-12 RX ORDER — CLINDAMYCIN PHOSPHATE 600 MG/50ML
600 INJECTION, SOLUTION INTRAVENOUS ONCE
Status: DISCONTINUED | OUTPATIENT
Start: 2018-04-12 | End: 2018-04-12

## 2018-04-12 RX ORDER — LIDOCAINE HYDROCHLORIDE 10 MG/ML
0.1 INJECTION, SOLUTION EPIDURAL; INFILTRATION; INTRACAUDAL; PERINEURAL AS NEEDED
Status: DISCONTINUED | OUTPATIENT
Start: 2018-04-12 | End: 2018-04-12 | Stop reason: HOSPADM

## 2018-04-12 RX ORDER — ROCURONIUM BROMIDE 10 MG/ML
INJECTION, SOLUTION INTRAVENOUS AS NEEDED
Status: DISCONTINUED | OUTPATIENT
Start: 2018-04-12 | End: 2018-04-12 | Stop reason: HOSPADM

## 2018-04-12 RX ORDER — LIDOCAINE HYDROCHLORIDE 20 MG/ML
INJECTION, SOLUTION EPIDURAL; INFILTRATION; INTRACAUDAL; PERINEURAL AS NEEDED
Status: DISCONTINUED | OUTPATIENT
Start: 2018-04-12 | End: 2018-04-12 | Stop reason: HOSPADM

## 2018-04-12 RX ORDER — ONDANSETRON 2 MG/ML
INJECTION INTRAMUSCULAR; INTRAVENOUS AS NEEDED
Status: DISCONTINUED | OUTPATIENT
Start: 2018-04-12 | End: 2018-04-12 | Stop reason: HOSPADM

## 2018-04-12 RX ORDER — DEXAMETHASONE SODIUM PHOSPHATE 4 MG/ML
INJECTION, SOLUTION INTRA-ARTICULAR; INTRALESIONAL; INTRAMUSCULAR; INTRAVENOUS; SOFT TISSUE AS NEEDED
Status: DISCONTINUED | OUTPATIENT
Start: 2018-04-12 | End: 2018-04-12 | Stop reason: HOSPADM

## 2018-04-12 RX ORDER — SUCCINYLCHOLINE CHLORIDE 20 MG/ML
INJECTION INTRAMUSCULAR; INTRAVENOUS AS NEEDED
Status: DISCONTINUED | OUTPATIENT
Start: 2018-04-12 | End: 2018-04-12 | Stop reason: HOSPADM

## 2018-04-12 RX ADMIN — GLYCOPYRROLATE 0.3 MG: 0.2 INJECTION INTRAMUSCULAR; INTRAVENOUS at 09:34

## 2018-04-12 RX ADMIN — DEXAMETHASONE SODIUM PHOSPHATE 4 MG: 4 INJECTION, SOLUTION INTRA-ARTICULAR; INTRALESIONAL; INTRAMUSCULAR; INTRAVENOUS; SOFT TISSUE at 08:42

## 2018-04-12 RX ADMIN — SODIUM CHLORIDE, SODIUM LACTATE, POTASSIUM CHLORIDE, AND CALCIUM CHLORIDE 150 ML/HR: 600; 310; 30; 20 INJECTION, SOLUTION INTRAVENOUS at 06:47

## 2018-04-12 RX ADMIN — LIDOCAINE HYDROCHLORIDE 80 MG: 20 INJECTION, SOLUTION EPIDURAL; INFILTRATION; INTRACAUDAL; PERINEURAL at 08:31

## 2018-04-12 RX ADMIN — FENTANYL CITRATE 50 MCG: 50 INJECTION, SOLUTION INTRAMUSCULAR; INTRAVENOUS at 08:41

## 2018-04-12 RX ADMIN — PROPOFOL 150 MG: 10 INJECTION, EMULSION INTRAVENOUS at 08:31

## 2018-04-12 RX ADMIN — SUCCINYLCHOLINE CHLORIDE 100 MG: 20 INJECTION INTRAMUSCULAR; INTRAVENOUS at 08:31

## 2018-04-12 RX ADMIN — FENTANYL CITRATE 150 MCG: 50 INJECTION, SOLUTION INTRAMUSCULAR; INTRAVENOUS at 08:31

## 2018-04-12 RX ADMIN — CEFAZOLIN 0.2 G: 1 INJECTION, POWDER, FOR SOLUTION INTRAMUSCULAR; INTRAVENOUS; PARENTERAL at 08:05

## 2018-04-12 RX ADMIN — ONDANSETRON 4 MG: 2 INJECTION INTRAMUSCULAR; INTRAVENOUS at 09:30

## 2018-04-12 RX ADMIN — ROCURONIUM BROMIDE 25 MG: 10 INJECTION, SOLUTION INTRAVENOUS at 08:38

## 2018-04-12 RX ADMIN — NEOSTIGMINE METHYLSULFATE 2.5 MG: 1 INJECTION INTRAVENOUS at 09:34

## 2018-04-12 RX ADMIN — ROCURONIUM BROMIDE 5 MG: 10 INJECTION, SOLUTION INTRAVENOUS at 08:31

## 2018-04-12 RX ADMIN — MIDAZOLAM HYDROCHLORIDE 2 MG: 1 INJECTION, SOLUTION INTRAMUSCULAR; INTRAVENOUS at 08:21

## 2018-04-12 NOTE — OP NOTES
OPERATIVE NOTE    Date of Procedure: 4/12/2018   Preoperative Diagnosis: VENTRAL HERNIA  Postoperative Diagnosis: VENTRAL HERNIA    Procedure(s):  LAPAROSCOPIC VENTRAL HERNIA REPAIR WITH MESH  Surgeon(s) and Role:     * Cindy Bush MD - Primary         Surgical Assistant: Scooter Salinas    Surgical Staff:  Circ-1: Mayra Pulido RN  Circ-Relief: Mitzy Andrews RN  Scrub Tech-1: Jermaine Shaffer  Scrub RN-Relief: Mitzy Andrews RN  Surg Asst-1: [facility]  Event Time In   Incision Start 6086   Incision Close 0940     Anesthesia: General   Estimated Blood Loss: Min  Specimens: * No specimens in log *   Findings: Supraumbilical 3 cm defect   Complications: none  Implants:     Implant Name Type Inv. Item Serial No.  Lot No. LRB No. Used Action   MESH SARAH ELLIPTICAL 4X6IN -- VENTRALIGHT S/T - SN/A   MESH SARAH ELLIPTICAL 4X6IN -- VENTRALIGHT S/T N/A [employer]ARD [employer]OL [id number] N/A 1 Implanted     INDICATIONS: See paper history and physical.     DESCRIPTION:  The patient voided before the procedure. After consent was obtained, the patient was taken to the operating room, placed in supine position. SCDs were placed, turned on and working. General anesthesia was instituted successfully. The patient's abdomen was then prepped and draped in the usual sterile fashion. A preoperative time-out was then instituted confirming any special needs. Preincision antibiotics were started 30 minutes prior to skin incision. The abdomen was entered through a 5mm left upper quadrant Garcia's point skin incision. The laparoscope was then used to access the patient's abdomen under direct camera vision with a blunt 5 mm tissue dissection port. Pneumoperitoneum was started and maintained at 15 mmHg. Camera was then reintroduced into the port, and there was no visible or palpable injury to the underlying tissues or bowel or organs.   A second 8 mm left abdomen midaxillary port was placed a handsbreadth down along the left side. Pre-peritoneal flaps were raised caudally and cephalad for approximately 12 cm along the patient's midline and the hernia contents reduced. I did not see a epigastric hernia after raising this flap. The supraumbilical defect was visualized and was measured at 3 cm. The insufflation was then taken down to approximately 5 mmHg. The hernia was reapproximated with two transfascial 0 Vicryl sutures. A 10.2 x 15.2 cm partially absorbable mesh was introduced through the 9 mm port, oriented appropriately to the midline of the abdomen and secured into place with absorbable laparoscopic tacks and midline transfascial sutures. All sponge, instruments, and needle counts were correct. The patient's abdomen was thoroughly desufflated. Exparel expanded with 20 mL of 0.5% plain marcaine was then administered transfascially along the sites of tacking. The 8 mm trocar site was closed with a 0 Vicryl figure-of-eight suture. The patient awoke from anesthesia in satisfactory condition. I discussed the findings of the case with his wife in the waiting area.     Bjorn Hidalgo MD

## 2018-04-12 NOTE — ADDENDUM NOTE
Addendum  created 04/12/18 1059 by Jihan Corado CRNA    Anesthesia Intra LDAs edited, LDA properties accepted

## 2018-04-12 NOTE — DISCHARGE SUMMARY
Discharge Summary    Patient: Harish Burn               Sex: male          DOA: 4/12/2018  6:04 AM       YOB: 1945      Age:  81 y.o.        LOS:  LOS: 0 days                Discharge Date:      Admission Diagnoses: VENTRAL HERNIA    Discharge Diagnoses:  Same    Procedure:  Procedure(s):  [address]    Discharge Condition: [address] [place] Course: Unremarkable operative procedure. Discharge to home in stable condition. Consults: None    Significant Diagnostic Studies: See full electronic record. Discharge Medications:     Current Discharge Medication List      START taking these medications    Details   docusate sodium (COLACE) 50 mg capsule Take 2 Caps by mouth two (2) times daily as needed for up to 90 days. Indications: constipation  Qty: 120 Cap, Refills: 2      polyethylene glycol (MIRALAX) 17 gram packet Take 1 Packet by mouth daily. Indications: constipation, If constipation last more than 24-48 hours, despite colace use. Qty: 7 Packet, Refills: 0      oxyCODONE-acetaminophen (PERCOCET) 5-325 mg per tablet Take 1-2 Tabs by mouth every four (4) hours as needed for Pain. Max Daily Amount: 12 Tabs. Indications: Pain, Acute post-operative pain  Qty: 40 Tab, Refills: 0    Associated Diagnoses: Ventral hernia without obstruction or gangrene         CONTINUE these medications which have NOT CHANGED    Details   pantoprazole (PROTONIX) 20 mg tablet Take 20 mg by mouth Daily (before breakfast). ergocalciferol (VITAMIN D2) 50,000 unit capsule Take 50,000 Units by mouth every seven (7) days. tuesday      losartan (COZAAR) 100 mg tablet Take 100 mg by mouth nightly. DOCOSAHEXANOIC ACID/EPA (FISH OIL PO) Take 2 Caps by mouth nightly. aspirin delayed-release 81 mg tablet Take 81 mg by mouth nightly. diazepam (VALIUM) 5 mg tablet Take 1 Tab by mouth every eight (8) hours as needed (Spasms).  Max Daily Amount: 15 mg.  Qty: 1 Tab, Refills: 0 multivitamin (ONE A DAY) tablet Take 1 Tab by mouth daily (after breakfast). azelastine (ASTELIN) 137 mcg (0.1 %) nasal spray 1 [place] by Both Nostrils route as needed for Rhinitis. Use in each nostril as directed      fexofenadine-pseudoephedrine (ALLEGRA-D)  mg Tb12 Take 1 Tab by mouth every twelve (12) hours as needed. mometasone (ELOCON) 0.1 % ointment Apply  to affected area as needed for Skin Irritation. Activity/Diet/Wound Care: See patient administered discharge instructions.     Follow-up: 2 weeks    Roxana Magana MD  [facility]  Office:  [phone]

## 2018-04-12 NOTE — H&P
Patient interviewed and examined again. No change to paper H/P. Roxana Magana MD        Assessment:     Symptomatic umbilical, ventral hernia hernia    Plan:     Laparoscopic hernia repair with Mesh    Signed By: Roxana Magana MD  [facility]  Office:  [phone]    April 12, 2018          General Surgery History and Physical    Subjective:      Brittney Reed is a 81 y.o.  male who presents with symptomatic umbilical, ventral hernia. See paper h/p for full details. Past Medical History:   Diagnosis Date    Arthritis     Spine and knees    Cancer ([address] 2008    Prostate    Chronic pain     Diabetes ([address]     controlled with diet and lost weight    GERD (gastroesophageal reflux disease)     Hypertension     Sleep apnea     compliant with c-pap    Stroke ([address] 03/19/2018    2 hours transient global amnesia    Thromboembolus ([address] 2008    DVT in one leg after Bilateral Knee replacement but doesn't remember which leg.     Transfusion history 2/11/2008    required 2units RBC      Past Surgical History:   Procedure Laterality Date    HX CERVICAL FUSION  2016    HX HERNIA REPAIR Right 1983    Inguinal hernia repair    HX ORTHOPAEDIC Right 1987    right knee arthroscopic    HX ORTHOPAEDIC Right 1986    right wrist arthroscopic surgery    HX ORTHOPAEDIC Bilateral 2008    Knee arthroplasty    HX ORTHOPAEDIC Right 2014    index finger 1st joint cyst removed    HX PROSTATECTOMY  2008      Family History   Problem Relation Age of Onset    Diabetes Mother     Alzheimer Father      Social History     Social History    Marital status:      Spouse name: N/A    Number of children: N/A    Years of education: N/A     Social History Main Topics    Smoking status: Former Smoker     Packs/day: 0.00     Quit date: 2/4/1996    Smokeless tobacco: Never Used      Comment: occ cigars years ago Quit; smoked pipes    Alcohol use No    Drug use: No    Sexual activity: Not Asked Other Topics Concern    None     Social History Narrative      Current Facility-Administered Medications   Medication Dose Route Frequency    lactated Ringers infusion  150 mL/hr IntraVENous CONTINUOUS    lidocaine (PF) (XYLOCAINE) 10 mg/mL (1 %) injection 0.1 mL  0.1 mL SubCUTAneous PRN    ceFAZolin 1.8 g/45 mL in NS (from 2 g/50 mL bag) (ANCEF) IVPB bag for Graded Allergy Challenge-2nd dose 1.8 g  1.8 g IntraVENous ONCE      Allergies   Allergen Reactions    Penicillins Hives     Childhood    Adderall [Dextroamphetamine-Amphetamine] Other (comments)     Mouth sores and tongue swelling       Review of Systems:     []     Unable to obtain  ROS due to  []    mental status change  []    sedated   []    intubated   [x]    Total of 12 system negative, unless specified below or in HPI:  Constitutional: negative fever, negative chills, negative weight loss  Eyes:   negative visual changes  ENT:   negative sore throat, tongue or lip swelling  Respiratory:  negative cough, negative dyspnea  Cards:  negative for chest pain, palpitations, lower extremity edema  GI:   negative for nausea, vomiting, diarrhea, and abdominal pain  :  negative for frequency, dysuria  Integument:  negative for rash and pruritus  Heme:  negative for easy bruising and gum/nose bleeding  Musculoskel: negative for myalgias,  back pain and muscle weakness  Neuro:  negative for headaches, dizziness, vertigo  Psych:  negative for feelings of anxiety, depression     Objective:      Patient Vitals for the past 8 hrs:   BP Temp Pulse Resp SpO2   18 0646 114/77 97.7 °F (36.5 °C) 82 16 97 %       Temp (24hrs), Av.7 °F (36.5 °C), Min:97.7 °F (36.5 °C), Max:97.7 °F (36.5 °C)      Physical Exam:  General:  Alert, cooperative, no distress, appears stated age. Eyes:  Conjunctivae/corneas clear. PERRL, EOMs intact. Nose: Nares normal. Septum midline. Mucosa normal. No drainage or sinus tenderness.    Mouth/Throat: Lips, mucosa, and tongue normal. Teeth and gums normal.   Neck: Supple, symmetrical, trachea midline, no adenopathy, thyroid: no enlargment/tenderness/nodules, no carotid bruit and no JVD. Back:   Symmetric, no curvature. ROM normal. No CVA tenderness. Lungs:   Clear to auscultation bilaterally. Heart:  Regular rate and rhythm, S1, S2 normal, no murmur, click, rub or gallop. Abdomen:   Soft, mila-umbilical, supraumbilical tenderness with non-reducible bulge. Bowel sounds normal. No masses,  No organomegaly. Extremities: Extremities normal, atraumatic, no cyanosis or edema. Pulses: 2+ and symmetric all extremities. Skin: Skin color, texture, turgor normal. No rashes or lesions   Lymph nodes: Cervical, supraclavicular, and axillary nodes normal.     Labs: No results for input(s): WBC, HGB, HCT, PLT, HGBEXT, HCTEXT, PLTEXT in the last 72 hours. No results for input(s): NA, K, CL, CO2, GLU, BUN, CREA, CA, MG, PHOS, ALB, TBIL, TBILI, SGOT, ALT in the last 72 hours. No results for input(s): INR in the last 72 hours.     No lab exists for component: INREXT

## 2018-04-12 NOTE — IP AVS SNAPSHOT
[facility] 
 
 
 [address] [facility] [address] [facility] 
[phone] Patient: Misael Medina MRN: [id number] LGT:3/84/4383 A check delfin indicates which time of day the medication should be taken. My Medications START taking these medications Instructions Each Dose to Equal  
 Morning Noon Evening Bedtime  
 docusate sodium 50 mg capsule Commonly known as:  Hope Kelsey Your last dose was: Your next dose is: Take 2 Caps by mouth two (2) times daily as needed for up to 90 days. Indications: constipation 100 mg  
    
   
   
   
  
 oxyCODONE-acetaminophen 5-325 mg per tablet Commonly known as:  PERCOCET Your last dose was: Your next dose is: Take 1-2 Tabs by mouth every four (4) hours as needed for Pain. Max Daily Amount: 12 Tabs. Indications: Pain, Acute post-operative pain 1-2 Tab  
    
   
   
   
  
 polyethylene glycol 17 gram packet Commonly known as:  Tianviraj Bowdenler Your last dose was: Your next dose is: Take 1 Packet by mouth daily. Indications: constipation, If constipation last more than 24-48 hours, despite colace use. 17 g CONTINUE taking these medications Instructions Each Dose to Equal  
 Morning Noon Evening Bedtime  
 aspirin delayed-release 81 mg tablet Your last dose was: Your next dose is: Take 81 mg by mouth nightly. 81 mg  
    
   
   
   
  
 azelastine 137 mcg (0.1 %) nasal spray Commonly known as:  ASTELIN Your last dose was: Your next dose is:    
   
   
 1 Spray by Both Nostrils route as needed for Rhinitis. Use in each nostril as directed 1 Spray  
    
   
   
   
  
 diazePAM 5 mg tablet Commonly known as:  VALIUM Your last dose was: Your next dose is: Take 1 Tab by mouth every eight (8) hours as needed (Spasms).  Max Daily Amount: 15 mg.  
 5 mg  
    
   
   
   
  
 fexofenadine-pseudoephedrine  mg Tb12 Commonly known as:  ALLEGRA-D Your last dose was: Your next dose is: Take 1 Tab by mouth every twelve (12) hours as needed. 1 Tab FISH OIL PO Your last dose was: Your next dose is: Take 2 Caps by mouth nightly. 2 Cap  
    
   
   
   
  
 losartan 100 mg tablet Commonly known as:  COZAAR Your last dose was: Your next dose is: Take 100 mg by mouth nightly. 100 mg  
    
   
   
   
  
 mometasone 0.1 % ointment Commonly known as:  Vidya Done Your last dose was: Your next dose is:    
   
   
 Apply  to affected area as needed for Skin Irritation. multivitamin tablet Commonly known as:  ONE A DAY Your last dose was: Your next dose is: Take 1 Tab by mouth daily (after breakfast). 1 Tab  
    
   
   
   
  
 pantoprazole 20 mg tablet Commonly known as:  PROTONIX Your last dose was: Your next dose is: Take 20 mg by mouth Daily (before breakfast). 20 mg  
    
   
   
   
  
 VITAMIN D2 50,000 unit capsule Generic drug:  ergocalciferol Your last dose was: Your next dose is: Take 50,000 Units by mouth every seven (7) days. tuesday [address] Units Where to Get Your Medications Information on where to get these meds will be given to you by the nurse or doctor. ! Ask your nurse or doctor about these medications  
  docusate sodium 50 mg capsule  
 oxyCODONE-acetaminophen 5-325 mg per tablet  
 polyethylene glycol 17 gram packet

## 2018-04-12 NOTE — BRIEF OP NOTE
BRIEF OPERATIVE NOTE    Date of Procedure: 4/12/2018   Preoperative Diagnosis: VENTRAL HERNIA  Postoperative Diagnosis: VENTRAL HERNIA    Procedure(s):  LAPAROSCOPIC VENTRAL HERNIA REPAIR WITH MESH  Surgeon(s) and Role:     * Diann Pantoja MD - Primary         Surgical Assistant: Shayan Trammell    Surgical Staff:  Circ-1: Duncan Barney RN  Circ-Relief: Suraj Cates RN  Scrub Tech-1: Ankit Shaffer  Scrub RN-Relief: Suraj Cates RN  Surg Asst-1: Piyush Rios  Event Time In   Incision Start 2217   Incision Close 0940     Anesthesia: General   Estimated Blood Loss: Min  Specimens: * No specimens in log *   Findings: Supraumbilical 3 cm defect   Complications: none  Implants:   Implant Name Type Inv.  Item Serial No.  Lot No. LRB No. Used Action   MESH SARAH ELLIPTICAL 4X6IN -- VENTRALIGHT S/T - SN/A   MESH [facility] ELLIPTICAL 4X6IN -- VENTRALIGHT S/T N/A BARD DAVOL [id number] N/A 1 Implanted

## 2018-04-12 NOTE — ANESTHESIA POSTPROCEDURE EVALUATION
Post-Anesthesia Evaluation and Assessment    Patient: Robert Canela MRN: [id number]  SSN: [id number]    YOB: 1945  Age: 81 y.o. Sex: male       Cardiovascular Function/Vital Signs  Visit Vitals    /58    Pulse (!) 54    Temp 36.3 °C (97.4 °F)    Resp 13    SpO2 100%       Patient is status post general anesthesia for Procedure(s):  [address]. Nausea/Vomiting: None    Postoperative hydration reviewed and adequate. Pain:  Pain Scale 1: Numeric (0 - 10) (04/12/18 0952)  Pain Intensity 1: 0 (04/12/18 0952)   Managed    Neurological Status:   Neuro (WDL): Within Defined Limits (04/12/18 0956)  Neuro  LUE Motor Response: Purposeful;Spontaneous  (04/12/18 0956)  LLE Motor Response: Purposeful;Spontaneous  (04/12/18 0956)  RUE Motor Response: Purposeful;Spontaneous  (04/12/18 0956)  RLE Motor Response: Purposeful;Spontaneous  (04/12/18 0956)   At baseline    Mental Status and Level of Consciousness: Arousable    Pulmonary Status:   O2 Device: Nasal cannula (2 lpm) (04/12/18 0952)   Adequate oxygenation and airway patent    Complications related to anesthesia: None    Post-anesthesia assessment completed.  No concerns    Signed By: Johnny Rae MD     April 12, 2018

## 2018-04-12 NOTE — IP AVS SNAPSHOT
[address] [facility] [address] 
 
 
 [address] [facility] 
[phone] Patient: Domi Byers MRN: [id number] EAU:[id number] About your hospitalization You were admitted on:  April 12, 2018 You last received care in the:  [facility] PAC[facility] You were discharged on:  April 12, 2018 Why you were hospitalized Your primary diagnosis was:  Not on File Follow-up Information Follow up With Details Comments Contact Info Claribel Chacko MD Schedule an appointment as soon as possible for a visit in 2 weeks  [address] [facility] [address] 
[phone] Elizabeth Allen MD   [address] [facility] [address] 100[address] [facility] 
[phone] Discharge Orders None A check delfin indicates which time of day the medication should be taken. My Medications START taking these medications Instructions Each Dose to Equal  
 Morning Noon Evening Bedtime  
 docusate sodium 50 mg capsule Commonly known as:  Citlaly Sauce Your last dose was: Your next dose is: Take 2 Caps by mouth two (2) times daily as needed for up to 90 days. Indications: constipation 100 mg  
    
   
   
   
  
 oxyCODONE-acetaminophen 5-325 mg per tablet Commonly known as:  PERCOCET Your last dose was: Your next dose is: Take 1-2 Tabs by mouth every four (4) hours as needed for Pain. Max Daily Amount: 12 Tabs. Indications: Pain, Acute post-operative pain 1-2 Tab  
    
   
   
   
  
 polyethylene glycol 17 gram packet Commonly known as:  Roni Whitehall Your last dose was: Your next dose is: Take 1 Packet by mouth daily. Indications: constipation, If constipation last more than 24-48 hours, despite colace use. 17 g CONTINUE taking these medications  Instructions Each Dose to Equal  
 Morning Noon Evening Bedtime  
 aspirin delayed-release 81 mg tablet Your last dose was: Your next dose is: Take 81 mg by mouth nightly. 81 mg  
    
   
   
   
  
 azelastine 137 mcg (0.1 %) nasal spray Commonly known as:  ASTELIN Your last dose was: Your next dose is:    
   
   
 1 Spray by Both Nostrils route as needed for Rhinitis. Use in each nostril as directed 1 Spray  
    
   
   
   
  
 diazePAM 5 mg tablet Commonly known as:  VALIUM Your last dose was: Your next dose is: Take 1 Tab by mouth every eight (8) hours as needed (Spasms). Max Daily Amount: 15 mg.  
 5 mg  
    
   
   
   
  
 fexofenadine-pseudoephedrine  mg Tb12 Commonly known as:  ALLEGRA-D Your last dose was: Your next dose is: Take 1 Tab by mouth every twelve (12) hours as needed. 1 Tab FISH OIL PO Your last dose was: Your next dose is: Take 2 Caps by mouth nightly. 2 Cap  
    
   
   
   
  
 losartan 100 mg tablet Commonly known as:  COZAAR Your last dose was: Your next dose is: Take 100 mg by mouth nightly. 100 mg  
    
   
   
   
  
 mometasone 0.1 % ointment Commonly known as:  John Saucer Your last dose was: Your next dose is:    
   
   
 Apply  to affected area as needed for Skin Irritation. multivitamin tablet Commonly known as:  ONE A DAY Your last dose was: Your next dose is: Take 1 Tab by mouth daily (after breakfast). 1 Tab  
    
   
   
   
  
 pantoprazole 20 mg tablet Commonly known as:  PROTONIX Your last dose was: Your next dose is: Take 20 mg by mouth Daily (before breakfast). 20 mg  
    
   
   
   
  
 VITAMIN D2 50,000 unit capsule Generic drug:  ergocalciferol Your last dose was: Your next dose is: Take 50,000 Units by mouth every seven (7) days. tuesday 98328 Units Where to Get Your Medications Information on where to get these meds will be given to you by the nurse or doctor. ! Ask your nurse or doctor about these medications  
  docusate sodium 50 mg capsule  
 oxyCODONE-acetaminophen 5-325 mg per tablet  
 polyethylene glycol 17 gram packet Opioid Education Prescription Opioids: What You Need to Know: 
 
Prescription opioids can be used to help relieve moderate-to-severe pain and are often prescribed following a surgery or injury, or for certain health conditions. These medications can be an important part of treatment but also come with serious risks. Opioids are strong pain medicines. Examples include hydrocodone, oxycodone, fentanyl, and morphine. Heroin is an example of an illegal opioid. It is important to work with your health care provider to make sure you are getting the safest, most effective care. WHAT ARE THE RISKS AND SIDE EFFECTS OF OPIOID USE? Prescription opioids carry serious risks of addiction and overdose, especially with prolonged use. An opioid overdose, often marked by slow breathing, can cause sudden death. The use of prescription opioids can have a number of side effects as well, even when taken as directed. · Tolerance-meaning you might need to take more of a medication for the same pain relief · Physical dependence-meaning you have symptoms of withdrawal when the medication is stopped. Withdrawal symptoms can include nausea, sweating, chills, diarrhea, stomach cramps, and muscle aches. Withdrawal can last up to several weeks, depending on which drug you took and how long you took it. · Increased sensitivity to pain · Constipation · Nausea, vomiting, and dry mouth · Sleepiness and dizziness · Confusion · Depression · Low levels of testosterone that can result in lower sex drive, energy, and strength · Itching and sweating RISKS ARE GREATER WITH:      
· History of drug misuse, substance use disorder, or overdose · Mental health conditions (such as depression or anxiety) · Sleep apnea · Older age (72 years or older) · Pregnancy Avoid alcohol while taking prescription opioids. Also, unless specifically advised by your health care provider, medications to avoid include: · Benzodiazepines (such as Xanax or Valium) · Muscle relaxants (such as Soma or Flexeril) · Hypnotics (such as Ambien or Lunesta) · Other prescription opioids KNOW YOUR OPTIONS Talk to your health care provider about ways to manage your pain that don't involve prescription opioids. Some of these options may actually work better and have fewer risks and side effects. Options may include: 
· Pain relievers such as acetaminophen, ibuprofen, and naproxen · Some medications that are also used for depression or seizures · Physical therapy and exercise · Counseling to help patients learn how to cope better with triggers of pain and stress. · Application of heat or cold compress · Massage therapy · Relaxation techniques Be Informed Make sure you know the name of your medication, how much and how often to take it, and its potential risks & side effects. IF YOU ARE PRESCRIBED OPIOIDS FOR PAIN: 
· Never take opioids in greater amounts or more often than prescribed. Remember the goal is not to be pain-free but to manage your pain at a tolerable level. · Follow up with your primary care provider to: · Work together to create a plan on how to manage your pain. · Talk about ways to help manage your pain that don't involve prescription opioids. · Talk about any and all concerns and side effects. · Help prevent misuse and abuse. · Never sell or share prescription opioids · Help prevent misuse and abuse. · Store prescription opioids in a secure place and out of reach of others (this may include visitors, children, friends, and family). · Safely dispose of unused/unwanted prescription opioids: Find your community drug take-back program or your pharmacy mail-back program, or flush them down the toilet, following guidance from the Food and Drug Administration (www.fda.gov/Drugs/ResourcesForYou). · Visit www.cdc.gov/drugoverdose to learn about the risks of opioid abuse and overdose. · If you believe you may be struggling with addiction, tell your health care provider and ask for guidance or call [facility] [employer] at 2-207-300-QZNO. Discharge Instructions [address] [place] EFFECT GUIDE The [address] [place] EFFECT GUIDE was provided to the PATIENT AND CARE PROVIDER. Information provided includes instruction about drug purpose and common side effects for the following medications:  
***DISCHARGE SUMMARY from your Nurse PATIENT INSTRUCTIONS After general anesthesia or intravenous sedation, for 24 hours or while taking prescription Narcotics: · Limit your activities · Do not drive and operate hazardous machinery · Do not make important personal or business decisions · Do  not drink alcoholic beverages · If you have not urinated within 8 hours after discharge, please contact your surgeon on call. Report the following to your surgeon: 
· Excessive pain, swelling, redness or odor of or around the surgical area · Temperature over 100.5 · Nausea and vomiting lasting longer than 4 hours or if unable to take medications · Any signs of decreased circulation or nerve impairment to extremity: change in color, persistent  numbness, tingling, coldness or increase pain · Any questions [address] Breathing deeply and coughing are very important exercises to do after surgery. Deep breathing and coughing open the little air tubes and air sacks in your lungs. You take deep breaths every day. You may not even notice - it is just something you do when you sigh or yawn. It is a natural exercise you do to keep these air passages open. After surgery, take deep breaths and cough, on purpose. DIRECTIONS: 
· Take 10 to 15 slow deep breaths every hour while awake. · Breathe in deeply, and hold it for 2 seconds. · Exhale slowly through puckered lips, like blowing up a balloon. · After every 4th or 5th deep breath, hug your pillow to your chest or belly and give a hard, deep cough. Yes, it will probably hurt. But doing this exercise is a very important part of healing after surgery. Take your pain medicine to help you do this exercise without too much pain. Coughing and deep breathing help prevent bronchitis and pneumonia after surgery. If you had chest or belly surgery, use a pillow as a \"hug emily\" and hold it tightly to your chest or belly when you cough. ANKLE PUMPS Ankle pumps increase the circulation of oxygenated blood to your lower extremities and decrease your risk for circulation problems such as blood clots. They also stretch the muscles, tendons and ligaments in your foot and ankle, and prevent joint contracture in the ankle and foot, especially after surgeries on the legs. It is important to do ankle pump exercises regularly after surgery because immobility increases your risk for developing a blood clot. Your doctor may also have you take an Aspirin for the next few days as well. If your doctor did not ask you to take an Aspirin, consult with him before starting Aspirin therapy on your own. The exercise is quite simple. · Slowly point your foot forward, feeling the muscles on the top of your lower leg stretch, and hold this position for 5 seconds. · Next, pull your foot back toward you as far as possible, stretching the calf muscles, and hold that position for 5 seconds. · Repeat with the other foot. · Perform 10 repetitions every hour while awake for both ankles if possible (down and then up with the foot once is one repetition). You should feel gentle stretching of the muscles in your lower leg when doing this exercise. If you feel pain, or your range of motion is limited, don't push too hard. Only go the limit your joint and muscles will let you go. If you have increasing pain, progressively worsening leg warmth or swelling, STOP the exercise and call your doctor. MEDICATION AND  
SIDE EFFECT GUIDE The [address] [facility] was provided to the PATIENT AND CARE PROVIDER. Information provided includes instruction about drug purpose and common side effects for the following medications:  
 [address] These are general instructions for a healthy lifestyle: *   Please give a list of your current medications to your Primary Care Provider. *   Please update this list whenever your medications are discontinued, doses are changed, or new medications (including over-the-counter products) are added. *   Please carry medication information at all times in case of emergency situations. About Smoking No smoking / No tobacco products Avoid exposure to second hand smoke Surgeon General's Warning:  Quitting smoking now greatly reduces serious risk to your health. Obesity, smoking, and sedentary lifestyle greatly increases your risk for illness and disease. A healthy diet, regular physical exercise & weight monitoring are important for maintaining a healthy lifestyle. Congestive Heart Failure You may be retaining fluid if you have a history of heart failure or if you experience any of the following symptoms:  Weight gain of 3 pounds or more overnight or 5 pounds in a week, increased swelling in your hands or feet or shortness of breath while lying flat in bed. Please call your doctor as soon as you notice any of these symptoms; do not wait until your next office visit. Recognize signs and symptoms of STROKE: 
F -  Face looks uneven A -  Arms unable to move or move evenly S -  Speech slurred or non-existent T -  Time-call 911 as soon as signs and symptoms begin-DO NOT go  
       back to bed or wait to see if you get better-TIME IS BRAIN. Warning Signs of HEART ATTACK Call 911 if you have these symptoms: 
 
? Chest discomfort. Most heart attacks involve discomfort in the center of the chest that lasts more than a few minutes, or that goes away and comes back. It can feel like uncomfortable pressure, squeezing, fullness, or pain. ? Discomfort in other areas of the upper body. Symptoms can include pain or discomfort in one or both arms, the back, neck, jaw, or stomach. ? Shortness of breath with or without chest discomfort. ? Other signs may include breaking out in a cold sweat, nausea, or lightheadedness. Don't wait more than five minutes to call [address]! Fast action can save your life. Calling 911 is almost always the fastest way to get lifesaving treatment. Emergency Medical Services staff can begin treatment when they arrive  up to an hour sooner than if someone gets to the hospital by car. The discharge information has been reviewed with the {PATIENT PARENT GUARDIAN:56333}. Any questions and concerns from the {PATIENT PARENT GUARDIAN:09532} have been addressed. The {PATIENT PARENT GUARDIAN:97913} verbalized understanding. Other information in your discharge envelope: 
[]     PRESCRIPTIONS []     PHYSICAL THERAPY PRESCRIPTION 
[]     APPOINTMENT CARDS []     [facility] Pamphlet for block or block with On-Q Catheter from   Anesthesia Service []     Medical device information sheets/pamphlets from their   
[]     School/work excuse note. []     /parent work excuse note. The following personal items collected during your admission are returned to you:  
Dental Appliance: Dental Appliances: None Vision: Visual Aid: Glasses, With patient (given to wife) Hearing Aid:   
Jewelry: Jewelry: None Clothing: Clothing: Pants, Undergarments, Shirt, Footwear, Jacket/Coat, Socks (placed in locker) Other Valuables: Other Valuables: Eyeglasses (given to wife) Valuables sent to safe:   Carbon Dioxide and a Sore Abdomen Taking deep breaths and coughing regularly will help with the abdominal pain that can sometimes radiate to your back and shoulders. This pain is caused by residual carbon dioxide gas from your surgery that normally is not present in the abdominal cavity. The gas will be reabsorbed by the body and exhaled through the lungs over the course of the next few days. Coughing and deep breathing will help. Abdominal Hernia Repair: What to Expect at Home Your Recovery After surgery to repair your hernia, you are likely to have pain for a few days. You may also feel like you have the flu, and you may have a low fever and feel tired and nauseated. This is common. You should feel better after a few days and will probably feel much better in 7 days. For several weeks you may feel twinges or pulling in the hernia repair when you move. You may have some bruising around the area of your hernia repair. This is normal. 
This care sheet gives you a general idea about how long it will take for you to recover. But each person recovers at a different pace. Follow the steps below to get better as quickly as possible. How can you care for yourself at home? Activity ? · Rest when you feel tired. Getting enough sleep will help you recover. ? · Try to walk each day.  Start by walking a little more than you did the day before. Bit by bit, increase the amount you walk. Walking boosts blood flow and helps prevent pneumonia and constipation. ? · If your doctor gives you an abdominal binder to wear, use it as directed. This is an elastic bandage that wraps around your belly and upper hips. It helps support your belly muscles after surgery. ? · Avoid strenuous activities, such as biking, jogging, weight lifting, or aerobic exercise, until your doctor says it is okay. ? · Avoid lifting anything that would make you strain. This may include heavy grocery bags and milk containers, a heavy briefcase or backpack, cat litter or dog food bags, a vacuum , or a child. ? · Ask your doctor when you can drive again. ? · Most people are able to return to work within 1 to 2 weeks after surgery. But if your job requires that you to do heavy lifting or strenuous activity, you may need to take 4 to 6 weeks off from work. ? · You may shower 24 to 48 hours after surgery, if your doctor okays it. Pat the cut (incision) dry. Do not take a bath for the first 2 weeks, or until your doctor tells you it is okay. ? · Ask your doctor when it is okay for you to have sex. Diet ? · You can eat your normal diet. If your stomach is upset, try bland, low-fat foods like plain rice, broiled chicken, toast, and yogurt. ? · Drink plenty of fluids (unless your doctor tells you not to). ? · You may notice that your bowel movements are not regular right after your surgery. This is common. Avoid constipation and straining with bowel movements. You may want to take a fiber supplement every day. If you have not had a bowel movement after a couple of days, ask your doctor about taking a mild laxative. Medicines ? · Your doctor will tell you if and when you can restart your medicines. He or she will also give you instructions about taking any new medicines.   
? · If you take blood thinners, such as warfarin (Coumadin), clopidogrel (Plavix), or aspirin, be sure to talk to your doctor. He or she will tell you if and when to start taking those medicines again. Make sure that you understand exactly what your doctor wants you to do. ? · Be safe with medicines. Take pain medicines exactly as directed. ¨ If the doctor gave you a prescription medicine for pain, take it as prescribed. ¨ If you are not taking a prescription pain medicine, ask your doctor if you can take an over-the-counter medicine. ? · If your doctor prescribed antibiotics, take them as directed. Do not stop taking them just because you feel better. You need to take the full course of antibiotics. ? · If you think your pain medicine is making you sick to your stomach: 
¨ Take your medicine after meals (unless your doctor has told you not to). ¨ Ask your doctor for a different pain medicine. Incision care ? · If you have strips of tape on the cut (incision) the doctor made, leave the tape on for a week or until it falls off. Or follow your doctor's instructions for removing the tape. ? · If you have staples closing the cut, you will need to visit your doctor in 1 to 2 weeks to have them removed. ? · Wash the area daily with warm, soapy water, and pat it dry. Don't use hydrogen peroxide or alcohol, which can slow healing. You may cover the area with a gauze bandage if it weeps or rubs against clothing. Change the bandage every day. Other instructions ? · Hold a pillow over your incision when you cough or take deep breaths. This will support your belly and decrease your pain. ? · Do breathing exercises at home as instructed by your doctor. This will help prevent pneumonia. ? · If you had laparoscopic surgery, you may also have pain in your left shoulder. The pain usually lasts about a day or two. Follow-up care is a key part of your treatment and safety.  Be sure to make and go to all appointments, and call your doctor if you are having problems. It's also a good idea to know your test results and keep a list of the medicines you take. When should you call for help? Call 911 anytime you think you may need emergency care. For example, call if: 
? · You passed out (lost consciousness). ? · You are short of breath. ?Call your doctor now or seek immediate medical care if: 
? · You are sick to your stomach and cannot drink fluids. ? · You have signs of a blood clot in your leg (called a deep vein thrombosis), such as: 
¨ Pain in your calf, back of the knee, thigh, or groin. ¨ Redness and swelling in your leg or groin. ? · You have signs of infection, such as: 
¨ Increased pain, swelling, warmth, or redness. ¨ Red streaks leading from the incision. ¨ Pus draining from the incision. ¨ A fever. ? · You cannot pass stools or gas. ? · You have pain that does not get better after you take pain medicine. ? · You have loose stitches, or your incision comes open. ? · Bright red blood has soaked through the bandage over your incision. ? Watch closely for changes in your health, and be sure to contact your doctor if you have any problems. Where can you learn more? Go to http://pearl-eduardo.info/. Enter B577 in the search box to learn more about \"Abdominal Hernia Repair: What to Expect at Home. \" Current as of: May 12, 2017 Content Version: 11.4 © 0304-6067 [employer]. Care instructions adapted under license by [employer] (which disclaims liability or warranty for this information). If you have questions about a medical condition or this instruction, always ask your healthcare professional. [place] [address] any warranty or liability for your use of this information. Introducing \[facility]\"" & HEALTH SERVICES! Dear Nicholas Gill: Thank you for requesting a [employer] account. Our records indicate that you already have an active [employer] account.   You can access your account anytime at [email or web address] [email or web address] Did you know that you can access your hospital and ER discharge instructions at any time in [email or web address]? You can also review all of your test results from your hospital stay or ER visit. Additional Information If you have questions, please visit the Frequently Asked Questions section of the [email or web address] website at [email or web address] Remember, [email or web address] is NOT to be used for urgent needs. For medical emergencies, dial 911. Now available from your iPhone and Android! Introducing Diony Joseph As a [employer] patient, I wanted to make you aware of our electronic visit tool called Diony Joseph. [email or web address] allows you to connect within minutes with a medical provider 24 hours a day, seven days a week via a mobile device or tablet or logging into a secure website from your computer. You can access Diony Joseph from anywhere in the [place]. A virtual visit might be right for you when you have a simple condition and feel like you just dont want to get out of bed, or cant get away from work for an appointment, when your regular [employer] provider is not available (evenings, weekends or holidays), or when youre out of town and need minor care. Electronic visits cost only $49 and if the [employer] provider determines a prescription is needed to treat your condition, one can be electronically transmitted to a nearby pharmacy*. Please take a moment to enroll today if you have not already done so. The enrollment process is free and takes just a few minutes. To enroll, please download the [employer] klever to your tablet or phone, or visit [email or web address] to enroll on your computer.    
And, as an [address] [place] [address] patient with a [employer] account, the results of your visits will be scanned into your electronic medical record and your primary care provider will be able to view the scanned results. We urge you to continue to see your regular [facility] provider for your ongoing medical care. And while your primary care provider may not be the one available when you seek a [facility] virtual visit, the peace of mind you get from getting a real diagnosis real time can be priceless. For more information on [facility], view our Frequently Asked Questions (FAQs) at [email or web address]. Sincerely, 
 
Tera Minor MD 
Chief Medical Officer [employer] *:  certain medications cannot be prescribed via [facility] Providers Seen During Your Hospitalization Provider Specialty Primary office phone Nicki Ruiz, [address] [facility] [phone] Your Primary Care Physician (PCP) Primary Care Physician Office Phone Office Fax Anam Goyal [phone] You are allergic to the following Allergen Reactions Penicillins Hives Childhood Ancef graded challenge done 4/12/18, tolerated well, no reaction noted. Adderall (Dextroamphetamine-Amphetamine) Other (comments) Mouth sores and tongue swelling Recent Documentation Smoking Status Former Smoker Emergency Contacts Name Discharge Info Relation Home Work Mobile Mikaela Isidro DISCHARGE CAREGIVER [3] Spouse [3] [phone] Patient Belongings The following personal items are in your possession at time of discharge: 
  Dental Appliances: None  Visual Aid: Glasses, With patient (given to wife)   Hearing Aids/Status: Bilateral, At home  Home Medications: None   Jewelry: None  Clothing: Pants, Undergarments, Shirt, Footwear, Jacket/Coat, Socks (placed in locker)    Other Valuables: Eyeglasses (given to wife) Please provide this summary of care documentation to your next provider. Signatures-by signing, you are acknowledging that this After Visit Summary has been reviewed with you and you have received a copy. Patient Signature:  ____________________________________________________________ Date:  ____________________________________________________________  
  
Claudene Born Provider Signature:  ____________________________________________________________ Date:  ____________________________________________________________

## 2018-04-12 NOTE — ANESTHESIA PREPROCEDURE EVALUATION
Anesthetic History   No history of anesthetic complications            Review of Systems / Medical History  Patient summary reviewed, nursing notes reviewed and pertinent labs reviewed    Pulmonary        Sleep apnea: CPAP           Neuro/Psych         TIA    Comments: Transient global amnesia 3/2018 Cardiovascular    Hypertension: well controlled              Exercise tolerance: >4 METS     GI/Hepatic/Renal     GERD: well controlled           Endo/Other    Diabetes: well controlled, type 2    Cancer    Comments: Prostate cancer 2008 Other Findings              Physical Exam    Airway  Mallampati: I    Neck ROM: normal range of motion   Mouth opening: Normal     Cardiovascular    Rhythm: regular  Rate: normal         Dental    Dentition: Caps/crowns     Pulmonary  Breath sounds clear to auscultation               Abdominal         Other Findings            Anesthetic Plan    ASA: 2  Anesthesia type: general          Induction: Intravenous  Anesthetic plan and risks discussed with: Patient and Spouse      Informed consent obtained.

## 2019-01-08 NOTE — PROGRESS NOTES
[address]  [address]  [address]  [place], [address] [facility]    Office [phone]  Fax [phone]  Cell [phone]        Андрей Black is a 72 y.o. male referred for evaluation of abnormal EKG. Consult requested by Caroline Gutierrez MD.    Assessment:  Encounter Diagnoses   Name Primary?  Essential hypertension Yes    Prediabetes     CECIL on CPAP      Recommendations:  Mr. Isidro's EKG is normal. Q wave in lead 3 alone is non-diagnostic. It is unchanged from previous EKGs dating back to 2016. He is at intermediate CAD risk, but has no exertional symptoms at a good functional capacity. I would not pursue stress testing at this time, however we did discuss the role of CT heart scan. HTN, controlled    Mild carotid plaque by duplex study last year. He is on aspirin, but is not on statin therapy at this time. Phone follow up after reviewing tests      Follow-up Disposition: Not on File     Subjective:  No prior cardiac hx except for HTN. During recent physical, his EKG was felt to be abnormal, poss old inferior MI based on computer interpretation. Mr. Giovana Her states he is doing well. His energy is good, and he has no exertional symptoms with the activities he does. He has CECIL, and is adherent with CPAP regularly. He is borderline diabetic, controlled with dietary changes. He does not smoke. He has HTN, controlled on treatment. He works with computers, and is sedentary during the day. Patient denies any exertional chest pain, dyspnea, palpitations, syncope, orthopnea, edema or paroxysmal nocturnal dyspnea.     Cardiac risk factors   HTN yes  DM yes  Smoking no    Cardiac testing  EKG 3/19/18 - SR, normal   Echo 3/20/18 - EF 60%, negative bubble study  Carotid Duplex 3/20/18 - 0-49% bilateral  12/18/18 - SR, non diagnostic Q wave in lead 3, unchanged from 2/4/16    Past Medical History:   Diagnosis Date    Arthritis     Spine and knees    [facility] [address] 2008 Prostate    Chronic pain     Diabetes ([facility] [address]     controlled with diet and lost weight    GERD (gastroesophageal reflux disease)     Hypertension     Sleep apnea     compliant with c-pap    [facility]2018    2 hours transient global amnesia    [facility] 2008    DVT in one leg after Bilateral Knee replacement but doesn't remember which leg.  Transfusion history 2/11/2008    required 2units RBC         Current Outpatient Medications   Medication Sig Dispense Refill    betamethasone dipropionate (DIPROLENE) 0.05 % ointment Apply  to affected area two (2) times a day.  CANNABIDIOL, CBD, EXTRACT PO Take  by mouth.  nabumetone (RELAFEN) 500 mg tablet Take  by mouth two (2) times a day.  ergocalciferol (VITAMIN D2) 50,000 unit capsule Take 50,000 Units by mouth every seven (7) days. tuesday      losartan (COZAAR) 100 mg tablet Take 100 mg by mouth nightly.  DOCOSAHEXANOIC ACID/EPA (FISH OIL PO) Take 2 Caps by mouth nightly.  aspirin delayed-release 81 mg tablet Take 81 mg by mouth nightly.  diazepam (VALIUM) 5 mg tablet Take 1 Tab by mouth every eight (8) hours as needed (Spasms). Max Daily Amount: 15 mg. 1 Tab 0    pantoprazole (PROTONIX) 20 mg tablet Take 20 mg by mouth Daily (before breakfast).  multivitamin (ONE A DAY) tablet Take 1 Tab by mouth daily (after breakfast).  azelastine (ASTELIN) 137 mcg (0.1 %) nasal spray 1 [place] by Both Nostrils route as needed for Rhinitis. Use in each nostril as directed      fexofenadine-pseudoephedrine (ALLEGRA-D)  mg Tb12 Take 1 Tab by mouth every twelve (12) hours as needed.  polyethylene glycol (MIRALAX) 17 gram packet Take 1 Packet by mouth daily. Indications: constipation, If constipation last more than 24-48 hours, despite colace use. 7 Packet 0    oxyCODONE-acetaminophen (PERCOCET) 5-325 mg per tablet Take 1-2 Tabs by mouth every four (4) hours as needed for Pain. Max Daily Amount: 12 Tabs. Indications: Pain, Acute post-operative pain 40 Tab 0    mometasone (ELOCON) 0.1 % ointment Apply  to affected area as needed for Skin Irritation. Allergies   Allergen Reactions    Penicillins Hives     Childhood  Ancef graded challenge done 4/12/18, tolerated well, no reaction noted.  Adderall [Dextroamphetamine-Amphetamine] Other (comments)     Mouth sores and tongue swelling          Review of Systems  Constitutional: Negative for fever, chills, malaise/fatigue and diaphoresis. Respiratory: Negative for cough, hemoptysis, sputum production, shortness of breath and wheezing. Cardiovascular: Negative for chest pain, palpitations, orthopnea, claudication, leg swelling and PND. Gastrointestinal: Negative for heartburn, nausea, vomiting, blood in stool and melena. Genitourinary: Negative for dysuria and flank pain. Musculoskeletal: Negative for joint pain and back pain. Skin: Negative for rash. Neurological: Negative for focal weakness, seizures, loss of consciousness, weakness and headaches. Endo/Heme/Allergies: Does not bruise/bleed easily. Psychiatric/Behavioral: Negative for memory loss. The patient does not have insomnia. Physical Exam    Visit Vitals  /80 (BP 1 Location: Right arm, BP Patient Position: Sitting)   Pulse 77   Ht 6' 2\" (1.88 m)   Wt 205 lb 6.4 oz (93.2 kg)   SpO2 98%   BMI 26.37 kg/m²     Wt Readings from Last 3 Encounters:   01/09/19 205 lb 6.4 oz (93.2 kg)   04/05/18 199 lb 7 oz (90.5 kg)   03/19/18 196 lb (88.9 kg)      General - well developed well nourished  Neck - JVP normal, thyroid nl  Cardiac - normal S1, S2, no murmurs, rubs or gallops.  No clicks  Vascular - carotids without bruits, radials, femorals and pedal pulses equal bilateral  Lungs - clear to auscultation bilaterals, no rales, wheezing or rhonchi  Abd - soft nontender, no HSM, no abd bruits  Extremities - no edema  Skin - no rash  Neuro - nonfocal  Psych - normal mood and affect      Cardiographics  EKG 3/19/18 - SR, normal   Echo 3/20/18 - EF 60%, negative bubble study  Carotid Duplex 3/20/18 - 0-49% bilateral  12/18/18 - SR, non diagnostic Q wave in lead 3, unchanged from 2/4/16    Written by Jaxson Louie, as dictated by Dr. Renetta Sullivan.    Renetta Sullivan MD

## 2019-01-09 ENCOUNTER — OFFICE VISIT (OUTPATIENT)
Dept: CARDIOLOGY CLINIC | Age: 74
End: 2019-01-09

## 2019-01-09 VITALS
OXYGEN SATURATION: 98 % | HEART RATE: 77 BPM | BODY MASS INDEX: 26.36 KG/M2 | HEIGHT: 74 IN | DIASTOLIC BLOOD PRESSURE: 80 MMHG | WEIGHT: 205.4 LBS | SYSTOLIC BLOOD PRESSURE: 110 MMHG

## 2019-01-09 DIAGNOSIS — R73.03 PREDIABETES: ICD-10-CM

## 2019-01-09 DIAGNOSIS — I10 ESSENTIAL HYPERTENSION: Primary | ICD-10-CM

## 2019-01-09 DIAGNOSIS — Z99.89 OSA ON CPAP: ICD-10-CM

## 2019-01-09 DIAGNOSIS — G47.33 OSA ON CPAP: ICD-10-CM

## 2019-01-09 RX ORDER — BETAMETHASONE DIPROPIONATE 0.5 MG/G
OINTMENT TOPICAL 2 TIMES DAILY
COMMUNITY

## 2019-01-09 RX ORDER — NABUMETONE 500 MG/1
TABLET, FILM COATED ORAL 2 TIMES DAILY
COMMUNITY

## 2019-01-09 NOTE — PROGRESS NOTES
Patient says he has dizziness occasionally  Visit Vitals  /80 (BP 1 Location: Right arm, BP Patient Position: Sitting)   Pulse 77   Ht 6' 2\" (1.88 m)   Wt 205 lb 6.4 oz (93.2 kg)   SpO2 98%   BMI 26.37 kg/m²

## 2019-01-09 NOTE — PATIENT INSTRUCTIONS
The Heart Team at [facility] is offering the latest in cardiovascular diagnostic testing--a Heart Scan    Most people who die from a heart attack have no previous symptoms. Knowing the condition of your heart could save your life. A Heart Scan can identify your risk of a heart attack before you experience symptoms of heart disease. Because the Heart Scan is a screening test, it is not covered by insurance, so patients are responsible for the cost of the exam. [facility] is offering this test at a limited-time price of $99, payable by check, credit card or cash. To schedule your Heart Scan or for more information, please call [phone]. You can also check our free online heart health assessment from [facility] here. What is a Heart Scan? A Heart Scan provides a picture of your hearts arteries (coronary arteries). Doctors use heart scans to look for calcium in the coronary arteries and to look for blockages. The result of this is called a coronary calcium score. Coronary calcium scoring is the most effective, noninvasive method to identify the presence of CAD (Coronary Artery Disease). CAD is the leading cause of death of men and women in the [place]. CAD occurs as plaque clogs and narrows the heart arteries. [facility] employs a state-of-the-art CT imaging system to measure the buildup of calcified plaque on the walls of the coronary arteries. CT scanners use X-rays. For your safety, the amount of radiation is kept to a minimum. Based on your calcium score, your doctor will tailor your treatment to lower your risk of a heart attack. Who should be screened? A Heart Scan is recommended for those at risk of developing heart disease, but who do not have any symptoms.  You may consider calcium scoring if you are a man over 36 or woman over 48 with one or more of the following risk factors:    Family history of heart disease and/or stroke  History of high blood pressure and/or high cholesterol  Diabetic over 28years old  History of smoking or exposure to secondhand smoke  Overweight  Inactive lifestyle  High stress levels  What to expect  A Heart Scan screen can identify your risk of a heart attack before you experience symptoms of heart disease. You simply lie down, fully clothed, and the scan lasts only a few minutes. Your total appointment time should be less than one hour. A radiologist experienced in reading CTs and Heart Scans will analyze your images and send a report to you and, with your approval, your physician. If your report indicates you are at risk, a [facility] cardiologist and your primary care physician will determine next steps for your treatment.

## 2019-02-07 ENCOUNTER — HOSPITAL ENCOUNTER (OUTPATIENT)
Dept: CT IMAGING | Age: 74
Discharge: HOME | End: 2019-02-07
Attending: SPECIALIST
Payer: SELF-PAY

## 2019-02-07 DIAGNOSIS — Z13.9 SCREENING PROCEDURE: ICD-10-CM

## 2019-02-07 PROCEDURE — 75571 CT HRT W/O DYE W/CA TEST: CPT

## 2019-02-11 NOTE — CARDIO/PULMONARY
Reached patient at his given mobile number and shared his coronary artery CT score of 1591 with him. We discussed the meaning of this score and our recommendation that he follow up with a cardiologist.  Patient plans to follow up with Dr. Sandee Paez and would like to be contacted by his office to schedule that visit - which they will do. Patient has no further questions at this time.

## 2019-02-12 ENCOUNTER — TELEPHONE (OUTPATIENT)
Dept: CARDIOLOGY CLINIC | Age: 74
End: 2019-02-12

## 2019-02-12 DIAGNOSIS — I10 ESSENTIAL HYPERTENSION: ICD-10-CM

## 2019-02-12 DIAGNOSIS — R93.1 AGATSTON CAC SCORE, >400: Primary | ICD-10-CM

## 2019-02-12 DIAGNOSIS — R94.31 ABNORMAL EKG: ICD-10-CM

## 2019-02-12 NOTE — TELEPHONE ENCOUNTER
Cardiac testing  EKG 3/19/18 - SR, normal   Echo 3/20/18 - EF 60%, negative bubble study  Carotid Duplex 3/20/18 - 0-49% bilateral  12/18/18 - SR, non diagnostic Q wave in lead 3, unchanged from 2/4/16    CT Heart Scan 2/7/19 - calcium score 1591     Mr Edilma Clarke was seen as a new patient on 1/9/19. Referred by Dr. Chi Daily for evaluation of abnormal EKG. We have discussed there results of his CT heart scan indicating the presence of multi-vessel atherosclerosis. Plan to proceed with further evaluation with [facility] in the near future. Will also obtain baseline advanced lipid studies with [facility]. Re-group in the office to review lab and stress test results.

## 2019-03-08 ENCOUNTER — DOCUMENTATION ONLY (OUTPATIENT)
Dept: CARDIOLOGY CLINIC | Age: 74
End: 2019-03-08

## 2019-03-08 NOTE — PROGRESS NOTES
Labs Drawn 2/21/19      In Range Out of Range   Lp-PLA2 72    CRP  2.6   OxLDL 54    Fibrinogen Mass     Apolipoprotein A1 132    Apolipoprotein B  108   ApoB/ApoA1 Ratio  0.82   sdLDL 28.2    Lp(a) 13    HDL2b 31    Total Cholesterol     LDL Particle Num.   1137   LDL-Cholesterol Calculated  118   Direct HDL Chol.  40    Triglycerides 112    Non-HDL Chol. 180    HDL Particle Num  26.1   Smal LDL Particle  117    LDL Size 21.2    Insulin 8.5    HbA1C  7.3   Glucose  163   OxLDL 54    TMAO  8.0   Adiponectin     Homocysteine     NT-proBNP 21    Vitamin D2/D3  37.7    Vitamin D2 27.4    Vitamin D3 10.3    Glucose  124   Calcium 10.1    Sodium 142    Potassium 4.7    Chloride 103    CO2 25    BUN 22    Creatinine 0.84    Albumin 4.6    Total Protein 7.7    Globulin 3.1    ALP 82    ALT 31    AST 24    Total bilirubin 0.4    EGFR, NonAA 88    EGFR,     Estradiol     Estrone, Serum     Total testosterone     Creatine Kinasee 85    Uric Acid 7.3    TSH 3.230    T3, Free     APO E Genotype 3/3

## 2019-03-20 NOTE — PROGRESS NOTES
Eligio Simmons, [address]  [address]  [place], [address] [facility]    Office [phone]  Fax [phone]  Cell [phone]    Phillip Dash is a 72 y.o. male. Last seen 2 months ago. Assessment:  Encounter Diagnoses   Name Primary?  Agatston CAC score, >400 Yes    Essential hypertension     Type 2 diabetes mellitus without complication, without long-term current use of insulin (HCC)     CECIL on CPAP     Dyslipidemia      Recommendations:  CAD by recent CT heart scan with high CAC (1591), normal Stress nuclear study. He has no sxs of angina at a good functional capacity. Drivers of risk include HTN, untreated dyslipidemia and T2DM. He has been on aspirin for many years. Reviewed sxs that would raise concern. Dyslipidemia. Reviewed [facility] labs in detail. ApoB 108, LDL-c 118 coupled with elevated CRP 2.6. Normal LPa and ApoE genotype. Favor statin therapy - Crestor 10 mg/d. He will have updated lipids with Isaac Moore MD in 3 months.     T2DM. Previously treated with diet. Current A1c 6.3%. Significant opportunity to impact diet with low carbs (avoid bread and Fresca). Favor starting Jardinace 10 mg/d based on CV benefit. Reviewed potential side effects and the importance of urinary hygiene. He will get updated A1c in 3 months with Isaac Moore MD.      Reviewed all this data in detail with the patient and his wife. Follow-up and Dispositions    · Return in about 6 months (around 9/21/2019). Subjective:  Mr. Alistair Verduzco states he is feeling well with no cardiac complaints. He tries to remain active. He states he follows a low carb diet overall. He states he has controlled his DM through diet for the past 4-5 years. He drinks Fresca. He does not consume alcohol. He enjoys bread. Patient denies any exertional chest pain, dyspnea, palpitations, syncope, orthopnea, edema or paroxysmal nocturnal dyspnea. He is here today with his wife.  Of note, he retires next Friday. Cardiac risk factors:  HTN yes  DM yes  Smoking no    Cardiac testing:  EKG 3/19/18 - SR, normal   Echo 3/20/18 - EF 60%, negative bubble study  Carotid Duplex 3/20/18 - 0-49% bilateral  12/18/18 - SR, non diagnostic Q wave in lead 3, unchanged from 2/4/16    CT Heart Scan 2/7/19 - calcium score 1591    Past Medical History:   Diagnosis Date    Arthritis     Spine and knees    Cancer ([facility] [address] 2008    Prostate    Chronic pain     [facility]     controlled with diet and lost weight    GERD (gastroesophageal reflux disease)     Hypertension     Sleep apnea     compliant with c-pap    Stroke ([facility] [address] 03/19/2018    2 hours transient global amnesia    [facility] 2008    DVT in one leg after Bilateral Knee replacement but doesn't remember which leg.  Transfusion history 2/11/2008    required 2units RBC         Current Outpatient Medications   Medication Sig Dispense Refill    nabumetone (RELAFEN) 500 mg tablet Take  by mouth two (2) times a day.  ergocalciferol (VITAMIN D2) 50,000 unit capsule Take 50,000 Units by mouth every seven (7) days. tuesday      losartan (COZAAR) 100 mg tablet Take 100 mg by mouth nightly.  DOCOSAHEXANOIC ACID/EPA (FISH OIL PO) Take 2 Caps by mouth nightly.  aspirin delayed-release 81 mg tablet Take 81 mg by mouth nightly.  diazepam (VALIUM) 5 mg tablet Take 1 Tab by mouth every eight (8) hours as needed (Spasms). Max Daily Amount: 15 mg. 1 Tab 0    pantoprazole (PROTONIX) 20 mg tablet Take 20 mg by mouth Daily (before breakfast).  multivitamin (ONE A DAY) tablet Take 1 Tab by mouth daily (after breakfast).  azelastine (ASTELIN) 137 mcg (0.1 %) nasal spray 1 [place] by Both Nostrils route as needed for Rhinitis. Use in each nostril as directed      fexofenadine-pseudoephedrine (ALLEGRA-D)  mg Tb12 Take 1 Tab by mouth every twelve (12) hours as needed.       mometasone (ELOCON) 0.1 % ointment Apply  to affected area as needed for Skin Irritation.  betamethasone dipropionate (DIPROLENE) 0.05 % ointment Apply  to affected area two (2) times a day. Allergies   Allergen Reactions    Penicillins Hives     Childhood  Ancef graded challenge done 4/12/18, tolerated well, no reaction noted.  Adderall [Dextroamphetamine-Amphetamine] Other (comments)     Mouth sores and tongue swelling          Review of Systems  Constitutional: Negative for fever, chills, malaise/fatigue and diaphoresis. Respiratory: Negative for cough, hemoptysis, sputum production, shortness of breath and wheezing. Cardiovascular: Negative for chest pain, palpitations, orthopnea, claudication, leg swelling and PND. Gastrointestinal: Negative for heartburn, nausea, vomiting, blood in stool and melena. Genitourinary: Negative for dysuria and flank pain. Musculoskeletal: Negative for joint pain and back pain. Skin: Negative for rash. Neurological: Negative for focal weakness, seizures, loss of consciousness, weakness and headaches. Endo/Heme/Allergies: Does not bruise/bleed easily. Psychiatric/Behavioral: Negative for memory loss. The patient does not have insomnia. Physical Exam    Visit Vitals  /78   Pulse 84   Resp 20   Ht 6' 2\" (1.88 m)   Wt 207 lb (93.9 kg)   SpO2 99%   BMI 26.58 kg/m²     Wt Readings from Last 3 Encounters:   03/21/19 207 lb (93.9 kg)   03/07/19 205 lb (93 kg)   01/09/19 205 lb 6.4 oz (93.2 kg)      General - well developed well nourished  Neck - JVP normal, thyroid nl  Cardiac - normal S1, S2, no murmurs, rubs or gallops.  No clicks  Vascular - carotids without bruits, radials, femorals and pedal pulses equal bilateral  Lungs - clear to auscultation bilaterals, no rales, wheezing or rhonchi  Abd - soft nontender, no HSM, no abd bruits  Extremities - no edema  Skin - no rash  Neuro - nonfocal  Psych - normal mood and affect      Cardiographics  EKG 3/19/18 - SR, normal   Echo 3/20/18 - EF 60%, negative bubble study  Carotid Duplex 3/20/18 - 0-49% bilateral  12/18/18 - SR, non diagnostic Q wave in lead 3, unchanged from 2/4/16  Exercise Cardiolite 3/7/19 - 9 minutes, normal perfusion, EF 61%    Written by Jacques Zaman, as dictated by Dr. Renetta Sullivan.    Renetta Sullivan MD

## 2019-03-21 ENCOUNTER — OFFICE VISIT (OUTPATIENT)
Dept: CARDIOLOGY CLINIC | Age: 74
End: 2019-03-21

## 2019-03-21 VITALS
HEIGHT: 74 IN | RESPIRATION RATE: 20 BRPM | HEART RATE: 84 BPM | SYSTOLIC BLOOD PRESSURE: 136 MMHG | BODY MASS INDEX: 26.56 KG/M2 | DIASTOLIC BLOOD PRESSURE: 78 MMHG | WEIGHT: 207 LBS | OXYGEN SATURATION: 99 %

## 2019-03-21 DIAGNOSIS — R93.1 AGATSTON CAC SCORE, >400: Primary | ICD-10-CM

## 2019-03-21 DIAGNOSIS — E11.9 TYPE 2 DIABETES MELLITUS WITHOUT COMPLICATION, WITHOUT LONG-TERM CURRENT USE OF INSULIN (HCC): ICD-10-CM

## 2019-03-21 DIAGNOSIS — G47.33 OSA ON CPAP: ICD-10-CM

## 2019-03-21 DIAGNOSIS — Z99.89 OSA ON CPAP: ICD-10-CM

## 2019-03-21 DIAGNOSIS — E78.5 DYSLIPIDEMIA: ICD-10-CM

## 2019-03-21 DIAGNOSIS — I10 ESSENTIAL HYPERTENSION: ICD-10-CM

## 2019-03-21 RX ORDER — ROSUVASTATIN CALCIUM 10 MG/1
10 TABLET, COATED ORAL
Qty: 90 TAB | Refills: 3 | Status: SHIPPED | OUTPATIENT
Start: 2019-03-21 | End: 2019-03-22 | Stop reason: SDUPTHER

## 2019-03-21 NOTE — PATIENT INSTRUCTIONS
Start Crestor 10 mg daily  Start Jardiance 10 mg daily  Continue aspirin 81 mg daily for secondary prevention

## 2019-03-21 NOTE — PROGRESS NOTES
Visit Vitals  /78   Pulse 84   Resp 20   Ht 6' 2\" (1.88 m)   Wt 207 lb (93.9 kg)   SpO2 99%   BMI 26.58 kg/m²     [facility] today  CT heart scan

## 2019-03-22 RX ORDER — ROSUVASTATIN CALCIUM 10 MG/1
10 TABLET, COATED ORAL
Qty: 90 TAB | Refills: 3 | Status: SHIPPED | OUTPATIENT
Start: 2019-03-22 | End: 2020-02-17 | Stop reason: SINTOL

## 2019-08-09 ENCOUNTER — TELEPHONE (OUTPATIENT)
Dept: CARDIOLOGY CLINIC | Age: 74
End: 2019-08-09

## 2019-08-09 NOTE — TELEPHONE ENCOUNTER
Patient states he is having hip replacement surgery on 8/27/19  With Dr. Shirley Ferreira @ [facility] and will need clearance from Dr. Mounika Tavarez. He would like to know if he needs to be seen to get clearance or if Dr. Mounika Tavarez will be able to give it without having to see him. Please advise.      Phone: [phone]

## 2019-08-12 ENCOUNTER — OFFICE VISIT (OUTPATIENT)
Dept: CARDIOLOGY CLINIC | Age: 74
End: 2019-08-12

## 2019-08-12 VITALS
HEART RATE: 74 BPM | RESPIRATION RATE: 20 BRPM | HEIGHT: 74 IN | WEIGHT: 196 LBS | SYSTOLIC BLOOD PRESSURE: 138 MMHG | BODY MASS INDEX: 25.15 KG/M2 | DIASTOLIC BLOOD PRESSURE: 68 MMHG | OXYGEN SATURATION: 94 %

## 2019-08-12 DIAGNOSIS — M16.11 PRIMARY OSTEOARTHRITIS OF RIGHT HIP: ICD-10-CM

## 2019-08-12 DIAGNOSIS — I10 ESSENTIAL HYPERTENSION: ICD-10-CM

## 2019-08-12 DIAGNOSIS — E78.5 DYSLIPIDEMIA: ICD-10-CM

## 2019-08-12 DIAGNOSIS — R93.1 AGATSTON CAC SCORE, >400: Primary | ICD-10-CM

## 2019-08-12 DIAGNOSIS — E11.9 TYPE 2 DIABETES MELLITUS WITHOUT COMPLICATION, WITHOUT LONG-TERM CURRENT USE OF INSULIN (HCC): ICD-10-CM

## 2019-08-12 NOTE — PROGRESS NOTES
Visit Vitals  /68   Pulse 74   Resp 20   Ht 6' 2\" (1.88 m)   Wt 196 lb (88.9 kg)   SpO2 94%   BMI 25.16 kg/m²     Cardiac stratification right hip replacement. Surgeon Effie Vázquez On 8/27/19  General anesthesia. Please faxed [phone]     increase fatigue and muscle aches with crestor.     EKG today;

## 2019-08-12 NOTE — LETTER
8/12/19 Patient: Tatyana Lainez YOB: 1945 Date of Visit: 8/12/2019 Bunny Corado MD 
[address] [facility] [address] [place] [address] [id number] VIA Facsimile: [phone] Dear Bunny Corado MD, Thank you for referring Mr. Dillon Dodson to [address] [place] [address] for evaluation. My notes for this consultation are attached. If you have questions, please do not hesitate to call me. I look forward to following your patient along with you. Sincerely, Rual Ruiz MD

## 2019-08-12 NOTE — PROGRESS NOTES
[address]  [address]  [place], [address] [facility]    Office [phone]  Fax [phone]  Cell [phone]       Phillip Kan is a 69 y.o. male. Last seen by me months ago. Referred for cardiac evaluation pre-op for right THR with Dr. General Zelaya 8/27/19 at [address] [place] [address]. Assessment:  Encounter Diagnoses   Name Primary?  Agatston CAC score, >400 Yes    Dyslipidemia     Essential hypertension     Type 2 diabetes mellitus without complication, without long-term current use of insulin (HCC)     Primary osteoarthritis of right hip      Recommendations:    CAD by CT heart scan 2/2019 with high CAC (1591), normal stress nuclear study 3/2019. He has no sxs of angina at a good functional capacity, most recently limited by right hip pain. Drivers of risk include HTN, dyslipidemia and T2DM. Continue ASA and statin therapy. Reviewed sxs that would raise concern. Dyslipidemia. With the addition of Cestor LDL from from 118 to 57 based on the patient's report. Continue Crestor 10mg/d. Suggest adding CoQ10 100mg BID after his surgery to mitigate any arthralgias    T2DM, now on Jardiance. Minimal impact on A1c, but he has lost 13 pounds  Continue current treatment. DJD right hip, low cardiac risk for hip replacement under GA. Can stop ASA 1 week pre-op. A copy of his EKG was given to Mr. Luther Villa. Reviewed all this data in detail with the patient and his wife. Follow-up and Dispositions    · Return in about 6 months (around 9/21/2019). Subjective:    Phillip Kan reports he is feeling well overall despite right hip pain. He has no cardiac complaints and describes no exertional sxs. Patient denies any exertional chest pain, dyspnea, palpitations, syncope, orthopnea, edema or paroxysmal nocturnal dyspnea. Had updated lipids with Radha Diallo MD in June. His LDL was 59. His A1c reduced to 6.5%. He has lost 13 pounds since starting Anthonette Anon, as well.      He is here today with his wife. Cardiac risk factors:  HTN yes  DM yes  Smoking no    Cardiac testing:  EKG 3/19/18 - SR, normal   Echo 3/20/18 - EF 60%, negative bubble study  Carotid Duplex 3/20/18 - 0-49% bilateral  12/18/18 - SR, non diagnostic Q wave in lead 3, unchanged from 2/4/16    CT Heart Scan 2/7/19 - calcium score 1591  Exercise Cardiolite 3/7/19 - 9 minutes, normal perfusion, EF 61%    Past Medical History:   Diagnosis Date    Arthritis     Spine and knees    [facility] ([facility] 2008    Prostate    Chronic pain     Diabetes ([facility]     controlled with diet and lost weight    GERD (gastroesophageal reflux disease)     Hypertension     Sleep apnea     compliant with c-pap    Stroke ([facility] 03/19/2018    2 hours transient global amnesia    Thromboembolus ([facility] 2008    DVT in one leg after Bilateral Knee replacement but doesn't remember which leg.  Transfusion history 2/11/2008    required 2units RBC         Current Outpatient Medications   Medication Sig Dispense Refill    rosuvastatin (CRESTOR) 10 mg tablet Take 1 Tab by mouth nightly. 90 Tab 3    empagliflozin (JARDIANCE) 10 mg tablet Take 1 Tab by mouth daily. 90 Tab 3    betamethasone dipropionate (DIPROLENE) 0.05 % ointment Apply  to affected area two (2) times a day.  nabumetone (RELAFEN) 500 mg tablet Take  by mouth two (2) times a day.  ergocalciferol (VITAMIN D2) 50,000 unit capsule Take 50,000 Units by mouth every seven (7) days. tuesday      losartan (COZAAR) 100 mg tablet Take 100 mg by mouth nightly.  DOCOSAHEXANOIC ACID/EPA (FISH OIL PO) Take 2 Caps by mouth nightly.  aspirin delayed-release 81 mg tablet Take 81 mg by mouth nightly.  diazepam (VALIUM) 5 mg tablet Take 1 Tab by mouth every eight (8) hours as needed (Spasms). Max Daily Amount: 15 mg. 1 Tab 0    pantoprazole (PROTONIX) 20 mg tablet Take 20 mg by mouth Daily (before breakfast).       multivitamin (ONE A DAY) tablet Take 1 Tab by mouth daily (after breakfast).  azelastine (ASTELIN) 137 mcg (0.1 %) nasal spray 1 [place] by Both Nostrils route as needed for Rhinitis. Use in each nostril as directed      fexofenadine-pseudoephedrine (ALLEGRA-D)  mg Tb12 Take 1 Tab by mouth every twelve (12) hours as needed.  mometasone (ELOCON) 0.1 % ointment Apply  to affected area as needed for Skin Irritation. Allergies   Allergen Reactions    Penicillins Hives     Childhood  Ancef graded challenge done 4/12/18, tolerated well, no reaction noted.  Adderall [Dextroamphetamine-Amphetamine] Other (comments)     Mouth sores and tongue swelling          Review of Systems  Constitutional: Negative for fever, chills, malaise/ and diaphoresis. +fatigue  Respiratory: Negative for cough, hemoptysis, sputum production, shortness of breath and wheezing. Cardiovascular: Negative for chest pain, palpitations, orthopnea, claudication, leg swelling and PND. Gastrointestinal: Negative for heartburn, nausea, vomiting, blood in stool and melena. Genitourinary: Negative for dysuria and flank pain. Musculoskeletal: Negative for joint pain +back pain. Skin: Negative for rash. Neurological: Negative for focal weakness, seizures, loss of consciousness, weakness and headaches. Endo/Heme/Allergies: Does not bruise/bleed easily. Psychiatric/Behavioral: Negative for memory loss. The patient does not have insomnia. Physical Exam    Visit Vitals  /68   Pulse 74   Resp 20   Ht 6' 2\" (1.88 m)   Wt 196 lb (88.9 kg)   SpO2 94%   BMI 25.16 kg/m²     Wt Readings from Last 3 Encounters:   08/12/19 196 lb (88.9 kg)   03/21/19 207 lb (93.9 kg)   03/07/19 205 lb (93 kg)      General - well developed well nourished  Neck - JVP normal, thyroid nl  Cardiac - normal S1, S2, no murmurs, rubs or gallops.  No clicks  Vascular - carotids without bruits, radials, femorals and pedal pulses equal bilateral  Lungs - clear to auscultation bilaterals, no rales, wheezing or rhonchi  Abd - soft nontender, no HSM, no abd bruits  Extremities - no edema  Skin - no rash  Neuro - nonfocal  Psych - normal mood and affect      Cardiographics  EKG 3/19/18 - SR, normal   Echo 3/20/18 - EF 60%, negative bubble study  Carotid Duplex 3/20/18 - 0-49% bilateral  12/18/18 - SR, non diagnostic Q wave in lead 3, unchanged from 2/4/16  Exercise Cardiolite 3/7/19 - 9 minutes, normal perfusion, EF 61%  EKG 8/12/19 - SR normal     Written by Jess Greco, as dictated by Leola Ramirez M.D.     Leola Ramirez MD

## 2020-02-17 ENCOUNTER — OFFICE VISIT (OUTPATIENT)
Dept: CARDIOLOGY CLINIC | Age: 75
End: 2020-02-17

## 2020-02-17 VITALS
WEIGHT: 198 LBS | DIASTOLIC BLOOD PRESSURE: 78 MMHG | BODY MASS INDEX: 25.41 KG/M2 | HEART RATE: 75 BPM | OXYGEN SATURATION: 96 % | SYSTOLIC BLOOD PRESSURE: 112 MMHG | HEIGHT: 74 IN

## 2020-02-17 DIAGNOSIS — R93.1 AGATSTON CAC SCORE, >400: Primary | ICD-10-CM

## 2020-02-17 DIAGNOSIS — E11.9 TYPE 2 DIABETES MELLITUS WITHOUT COMPLICATION, WITHOUT LONG-TERM CURRENT USE OF INSULIN (HCC): ICD-10-CM

## 2020-02-17 DIAGNOSIS — I10 ESSENTIAL HYPERTENSION: ICD-10-CM

## 2020-02-17 DIAGNOSIS — E78.5 DYSLIPIDEMIA: ICD-10-CM

## 2020-02-17 RX ORDER — ATORVASTATIN CALCIUM 10 MG/1
TABLET, FILM COATED ORAL DAILY
COMMUNITY

## 2020-02-17 RX ORDER — CHOLECALCIFEROL (VITAMIN D3) 125 MCG
100 CAPSULE ORAL DAILY
COMMUNITY

## 2020-02-17 NOTE — PROGRESS NOTES
Aidee Vicente is a 69 y.o. male    Chief Complaint   Patient presents with    Cholesterol Problem    Hypertension       Chest pain no  SOB no  Dizziness no  Swelling no  Recent hospital visit no  Refills no  Visit Vitals  /78 (BP 1 Location: Left arm, BP Patient Position: Sitting)   Pulse 75   Ht 6' 2\" (1.88 m)   Wt 198 lb (89.8 kg)   SpO2 96%   BMI 25.42 kg/m²

## 2020-02-17 NOTE — LETTER
2/17/20 Patient: Nito Her YOB: 1945 Date of Visit: 2/17/2020 Jaylyn Lew MD 
[address] [facility] [address] [place] [id number] VIA Facsimile: [phone] Dear Jaylyn Lew MD, Thank you for referring Mr. Elan Petersen to [facility] 2[facility]0 [address] [place] [address] for evaluation. My notes for this consultation are attached. If you have questions, please do not hesitate to call me. I look forward to following your patient along with you. Sincerely, Therese Ferrell MD

## 2020-02-17 NOTE — PROGRESS NOTES
[address]am[facility]  [address]  [place], [address] [facility]    Office [phone]  Fax [phone]  Cell [phone]       Fabrice Dawn is a 69 y.o. male. Last seen by me 6 months ago. Assessment:  Encounter Diagnoses   Name Primary?  Agatston CAC score, >400 Yes    Type 2 diabetes mellitus without complication, without long-term current use of insulin (HCC)     Dyslipidemia     Essential hypertension      Recommendations:    CAD by CT heart scan 2/2019 with high CAC (1591), normal stress nuclear study 3/2019. He has no sxs of angina at a good functional capacity. Drivers of risk include HTN, dyslipidemia and T2DM. -Continue ASA and statin therapy. Reviewed sxs that would raise concern. Dyslipidemia. Intolerance to Crestor d/t arthralgias, now on atorvostatin 10mg/d, follow up lipids pending. Target ideal LDL is less than 70.  -Continue Atorva 10mg/d  -Continue CoQ10 100mg BID     T2DM, on combination therapy now on Jardiance, latter added 1 year ago. Minimal impact on A1c, but he has lost 13 pounds. Reviewed Long term CV benefit with Mr. Rose Toney.   -Continue current treatment. Extend F/u 1 year, PRN. Follow-up and Dispositions    · Return in about 1 year (around 2/17/2021). Subjective:    Fabrice Dawn reports he is feeling well post right hip replacement surgery which was last August. He reports he is active, but not on an exercise regiment, and not limited by hip. He denies exertional sxs. Patient denies any exertional chest pain, dyspnea, palpitations, syncope, orthopnea, edema or paroxysmal nocturnal dyspnea. He denies any claudication sxs. He reports he lost 12-14 lbs on Jardiance. D/t diffuse arthralgias, he switched from Crestor to Lipitor .      Cardiac risk factors:  HTN yes  DM yes  Smoking no    Cardiac testing:  EKG 3/19/18 - SR, normal   Echo 3/20/18 - EF 60%, negative bubble study  Carotid Duplex 3/20/18 - 0-49% bilateral  12/18/18 - SR, non diagnostic Q wave in lead 3, unchanged from 2/4/16    CT Heart Scan 2/7/19 - calcium score 1591  Exercise Cardiolite 3/7/19 - 9 minutes, normal perfusion, EF 61%    Past Medical History:   Diagnosis Date    Arthritis     Spine and knees    [facility]    Prostate    Chronic pain     Diabetes ([facility]     controlled with diet and lost weight    GERD (gastroesophageal reflux disease)     Hypertension     Sleep apnea     compliant with c-pap    Stroke ([facility] 03/19/2018    2 hours transient global amnesia    Thromboembolus ([facility] 2008    DVT in one leg after Bilateral Knee replacement but doesn't remember which leg.  Transfusion history 2/11/2008    required 2units RBC         Current Outpatient Medications   Medication Sig Dispense Refill    atorvastatin (LIPITOR) 10 mg tablet Take  by mouth daily.  calcium-cholecalciferol, d3, 600-125 mg-unit tab Take  by mouth.  co-enzyme Q-10 (CO Q-10) 100 mg capsule Take 100 mg by mouth daily.  empagliflozin (JARDIANCE) 10 mg tablet Take 1 Tab by mouth daily. 90 Tab 3    betamethasone dipropionate (DIPROLENE) 0.05 % ointment Apply  to affected area two (2) times a day.  nabumetone (RELAFEN) 500 mg tablet Take  by mouth two (2) times a day.  losartan (COZAAR) 100 mg tablet Take 100 mg by mouth nightly.  DOCOSAHEXANOIC ACID/EPA (FISH OIL PO) Take 2 Caps by mouth nightly.  aspirin delayed-release 81 mg tablet Take 81 mg by mouth nightly.  diazepam (VALIUM) 5 mg tablet Take 1 Tab by mouth every eight (8) hours as needed (Spasms). Max Daily Amount: 15 mg. 1 Tab 0    pantoprazole (PROTONIX) 20 mg tablet Take 20 mg by mouth Daily (before breakfast).  multivitamin (ONE A DAY) tablet Take 1 Tab by mouth daily (after breakfast).  azelastine (ASTELIN) 137 mcg (0.1 %) nasal spray 1 [place] by Both Nostrils route as needed for Rhinitis.  Use in each nostril as directed      fexofenadine-pseudoephedrine (ALLEGRA-D)  mg Tb12 Take 1 Tab by mouth every twelve (12) hours as needed.  mometasone (ELOCON) 0.1 % ointment Apply  to affected area as needed for Skin Irritation.  ergocalciferol (VITAMIN D2) 50,000 unit capsule Take 50,000 Units by mouth every seven (7) days. tuesday         Allergies   Allergen Reactions    Penicillins Hives     Childhood  Ancef graded challenge done 4/12/18, tolerated well, no reaction noted.  Adderall [Dextroamphetamine-Amphetamine] Other (comments)     Mouth sores and tongue swelling    Crestor [Rosuvastatin] Myalgia          Review of Systems  Constitutional: Negative for fever, chills, malaise/fatigue and diaphoresis. Respiratory: Negative for cough, hemoptysis, sputum production, shortness of breath and wheezing. Cardiovascular: Negative for chest pain, palpitations, orthopnea, claudication, leg swelling and PND. Gastrointestinal: Negative for heartburn, nausea, vomiting, blood in stool and melena. Genitourinary: Negative for dysuria and flank pain. Musculoskeletal: Negative for joint pain and back pain. Skin: Negative for rash. Neurological: Negative for focal weakness, seizures, loss of consciousness, weakness and headaches. Endo/Heme/Allergies: Does not bruise/bleed easily. Psychiatric/Behavioral: Negative for memory loss. The patient does not have insomnia. Physical Exam    Visit Vitals  /78 (BP 1 Location: Left arm, BP Patient Position: Sitting)   Pulse 75   Ht 6' 2\" (1.88 m)   Wt 198 lb (89.8 kg)   SpO2 96%   BMI 25.42 kg/m²     Wt Readings from Last 3 Encounters:   02/17/20 198 lb (89.8 kg)   08/12/19 196 lb (88.9 kg)   03/21/19 207 lb (93.9 kg)      General - well developed well nourished  Neck - JVP normal, thyroid nl  Cardiac - normal S1, S2, no murmurs, rubs or gallops.  No clicks  Vascular - carotids without bruits, radials, femorals and pedal pulses equal bilateral  Lungs - clear to auscultation bilaterals, no rales, wheezing or rhonchi  Abd - soft nontender, no HSM, no abd bruits  Extremities - no edema  Skin - no rash  Neuro - nonfocal  Psych - normal mood and affect      Cardiographics  EKG 3/19/18 - SR, normal   Echo 3/20/18 - EF 60%, negative bubble study  Carotid Duplex 3/20/18 - 0-49% bilateral  12/18/18 - SR, non diagnostic Q wave in lead 3, unchanged from 2/4/16  Exercise Cardiolite 3/7/19 - 9 minutes, normal perfusion, EF 61%  EKG 8/12/19 - SR normal     Written by Jacalyn Halsted. Margie Mesa, as dictated by Therese Ferrell M.D.     Therese Ferrell MD

## 2020-03-23 RX ORDER — EMPAGLIFLOZIN 10 MG/1
TABLET, FILM COATED ORAL
Qty: 90 TAB | Refills: 2 | Status: SHIPPED | OUTPATIENT
Start: 2020-03-23 | End: 2020-12-28

## 2020-03-23 NOTE — TELEPHONE ENCOUNTER
Requested Prescriptions     Pending Prescriptions Disp Refills    Jardiance 10 mg tablet [Pharmacy Med Name: Jardiance 10 MG Oral Tablet] 90 Tab 2     Sig: Take 1 tablet by mouth once daily     Refill approved VO

## 2020-09-17 NOTE — DISCHARGE INSTRUCTIONS
A Note for the Pre-Diabetic / Diabetic Patient    · Your blood glucose after surgery was  129 . Surgical stress on the body can make the blood sugar run higher than usual. Also, steroid-type drugs can be given sometimes to help reduce swelling and nausea. Steroids can unfortunately raise the blood sugar for the next few days as a side-effect of steroid medicines. After surgery, monitor your blood sugar closely. Watch what you eat by closely following a diabetic diet plan. If your blood sugar remains high, notify the doctor who monitors you blood sugar control for further instructions. [facility] MEDICATION AND   SIDE EFFECT GUIDE    The [facility] MEDICATION AND SIDE EFFECT GUIDE was provided to the PATIENT AND CARE PROVIDER. Information provided includes instruction about drug purpose and common side effects for the following medications:   129  At 1030 AM                          DISCHARGE SUMMARY from your Nurse      PATIENT INSTRUCTIONS    After general anesthesia or intravenous sedation, for 24 hours or while taking prescription Narcotics:  · Limit your activities  · Do not drive and operate hazardous machinery  · Do not make important personal or business decisions  · Do  not drink alcoholic beverages  · If you have not urinated within 8 hours after discharge, please contact your surgeon on call. Report the following to your surgeon:  · Excessive pain, swelling, redness or odor of or around the surgical area  · Temperature over 100.5  · Nausea and vomiting lasting longer than 4 hours or if unable to take medications  · Any signs of decreased circulation or nerve impairment to extremity: change in color, persistent  numbness, tingling, coldness or increase pain  · Any questions      COUGH AND DEEP BREATHE    Breathing deeply and coughing are very important exercises to do after surgery. Deep breathing and coughing open the little air tubes and air sacks in your lungs.        You take deep [FreeTextEntry1] : Dressing removed today without any difficulty. Patient doing well. Apply bacitracin ointment as instructed to the penis. Change antibiotic treatment to Keflex at this time.  Follow-up in 1 week for catheter removal. Parent to contact me if catheter comes out prior to next appointment. breaths every day. You may not even notice - it is just something you do when you sigh or yawn. It is a natural exercise you do to keep these air passages open. After surgery, take deep breaths and cough, on purpose. DIRECTIONS:  · Take 10 to 15 slow deep breaths every hour while awake. · Breathe in deeply, and hold it for 2 seconds. · Exhale slowly through puckered lips, like blowing up a balloon. · After every 4th or 5th deep breath, hug your pillow to your chest or belly and give a hard, deep cough. Yes, it will probably hurt. But doing this exercise is a very important part of healing after surgery. Take your pain medicine to help you do this exercise without too much pain. Coughing and deep breathing help prevent bronchitis and pneumonia after surgery. If you had chest or belly surgery, use a pillow as a \"hug emily\" and hold it tightly to your chest or belly when you cough. ANKLE PUMPS    Ankle pumps increase the circulation of oxygenated blood to your lower extremities and decrease your risk for circulation problems such as blood clots. They also stretch the muscles, tendons and ligaments in your foot and ankle, and prevent joint contracture in the ankle and foot, especially after surgeries on the legs. It is important to do ankle pump exercises regularly after surgery because immobility increases your risk for developing a blood clot. Your doctor may also have you take an Aspirin for the next few days as well. If your doctor did not ask you to take an Aspirin, consult with him before starting Aspirin therapy on your own. The exercise is quite simple. · Slowly point your foot forward, feeling the muscles on the top of your lower leg stretch, and hold this position for 5 seconds. · Next, pull your foot back toward you as far as possible, stretching the calf muscles, and hold that position for 5 seconds.                  · Repeat with the other foot.  · Perform 10 repetitions every hour while awake for both ankles if possible (down and then up with the foot once is one repetition). You should feel gentle stretching of the muscles in your lower leg when doing this exercise. If you feel pain, or your range of motion is limited, don't push too hard. Only go the limit your joint and muscles will let you go. If you have increasing pain, progressively worsening leg warmth or swelling, STOP the exercise and call your doctor. MEDICATION AND   SIDE EFFECT GUIDE    The [facility] MEDICATION AND SIDE EFFECT GUIDE was provided to the PATIENT AND CARE PROVIDER. Information provided includes instruction about drug purpose and common side effects for the following medications:    PERCOCET AND COLACE         These are general instructions for a healthy lifestyle:    *   Please give a list of your current medications to your Primary Care Provider. *   Please update this list whenever your medications are discontinued, doses are changed, or new medications (including over-the-counter products) are added. *   Please carry medication information at all times in case of emergency situations. About Smoking  No smoking / No tobacco products  Avoid exposure to second hand smoke     Surgeon General's Warning:  Quitting smoking now greatly reduces serious risk to your health. Obesity, smoking, and sedentary lifestyle greatly increases your risk for illness and disease. A healthy diet, regular physical exercise & weight monitoring are important for maintaining a healthy lifestyle. Congestive Heart Failure  You may be retaining fluid if you have a history of heart failure or if you experience any of the following symptoms:  Weight gain of 3 pounds or more overnight or 5 pounds in a week, increased swelling in your hands or feet or shortness of breath while lying flat in bed.   Please call your doctor as soon as you notice any of these symptoms; do not wait until your next office visit. Recognize signs and symptoms of STROKE:  F -  Face looks uneven  A -  Arms unable to move or move evenly  S -  Speech slurred or non-existent  T -  Time-call 911 as soon as signs and symptoms begin-DO NOT go          back to bed or wait to see if you get better-TIME IS BRAIN. Warning Signs of HEART ATTACK   Call 911 if you have these symptoms:     Chest discomfort. Most heart attacks involve discomfort in the center of the chest that lasts more than a few minutes, or that goes away and comes back. It can feel like uncomfortable pressure, squeezing, fullness, or pain.  Discomfort in other areas of the upper body. Symptoms can include pain or discomfort in one or both arms, the back, neck, jaw, or stomach.  Shortness of breath with or without chest discomfort.  Other signs may include breaking out in a cold sweat, nausea, or lightheadedness. Don't wait more than five minutes to call 911 - MINUTES MATTER! Fast action can save your life. Calling 911 is almost always the fastest way to get lifesaving treatment. Emergency Medical Services staff can begin treatment when they arrive -- up to an hour sooner than if someone gets to the hospital by car. The discharge information has been reviewed with the patient and spouse. Any questions and concerns from the patient and spouse have been addressed. The patient and spouse verbalized understanding. Other information in your discharge envelope:  []     PRESCRIPTIONS  []     PHYSICAL THERAPY PRESCRIPTION  []     APPOINTMENT CARDS  []     Regional Anesthesia Pamphlet for block or block with On-Q Catheter from   Anesthesia Service  []     Medical device information sheets/pamphlets from their    []     School/work excuse note. []     /parent work excuse note.         The following personal items collected during your admission are returned to you:   Dental Appliance: Dental Appliances: None  Vision: Visual Aid: Glasses, With patient (given to wife)  Hearing Aid:    Jewelry: Jewelry: None  Clothing: Clothing: Pants, Undergarments, Shirt, Footwear, Jacket/Coat, Socks (placed in locker)  Other Valuables: Other Valuables: Eyeglasses (given to wife)  Valuables sent to safe:   Carbon Dioxide and a Sore Abdomen    Taking deep breaths and coughing regularly will help with the abdominal pain that can sometimes radiate to your back and shoulders. This pain is caused by residual carbon dioxide gas from your surgery that normally is not present in the abdominal cavity. The gas will be reabsorbed by the body and exhaled through the lungs over the course of the next few days. Coughing and deep breathing will help. Abdominal Hernia Repair: What to Expect at Home  Your Recovery  After surgery to repair your hernia, you are likely to have pain for a few days. You may also feel like you have the flu, and you may have a low fever and feel tired and nauseated. This is common. You should feel better after a few days and will probably feel much better in 7 days. For several weeks you may feel twinges or pulling in the hernia repair when you move. You may have some bruising around the area of your hernia repair. This is normal.  This care sheet gives you a general idea about how long it will take for you to recover. But each person recovers at a different pace. Follow the steps below to get better as quickly as possible. How can you care for yourself at home? Activity  ? · Rest when you feel tired. Getting enough sleep will help you recover. ? · Try to walk each day. Start by walking a little more than you did the day before. Bit by bit, increase the amount you walk. Walking boosts blood flow and helps prevent pneumonia and constipation. ? · If your doctor gives you an abdominal binder to wear, use it as directed. This is an elastic bandage that wraps around your belly and upper hips.  It helps support your belly muscles after surgery. ? · Avoid strenuous activities, such as biking, jogging, weight lifting, or aerobic exercise, until your doctor says it is okay. ? · Avoid lifting anything that would make you strain. This may include heavy grocery bags and milk containers, a heavy briefcase or backpack, cat litter or dog food bags, a vacuum , or a child. ? · Ask your doctor when you can drive again. ? · Most people are able to return to work within 1 to 2 weeks after surgery. But if your job requires that you to do heavy lifting or strenuous activity, you may need to take 4 to 6 weeks off from work. ? · You may shower 24 to 48 hours after surgery, if your doctor okays it. Pat the cut (incision) dry. Do not take a bath for the first 2 weeks, or until your doctor tells you it is okay. ? · Ask your doctor when it is okay for you to have sex. Diet  ? · You can eat your normal diet. If your stomach is upset, try bland, low-fat foods like plain rice, broiled chicken, toast, and yogurt. ? · Drink plenty of fluids (unless your doctor tells you not to). ? · You may notice that your bowel movements are not regular right after your surgery. This is common. Avoid constipation and straining with bowel movements. You may want to take a fiber supplement every day. If you have not had a bowel movement after a couple of days, ask your doctor about taking a mild laxative. Medicines  ? · Your doctor will tell you if and when you can restart your medicines. He or she will also give you instructions about taking any new medicines. ? · If you take blood thinners, such as warfarin (Coumadin), clopidogrel (Plavix), or aspirin, be sure to talk to your doctor. He or she will tell you if and when to start taking those medicines again. Make sure that you understand exactly what your doctor wants you to do. ? · Be safe with medicines. Take pain medicines exactly as directed.   ¨ If the doctor gave you a prescription medicine for pain, take it as prescribed. ¨ If you are not taking a prescription pain medicine, ask your doctor if you can take an over-the-counter medicine. ? · If your doctor prescribed antibiotics, take them as directed. Do not stop taking them just because you feel better. You need to take the full course of antibiotics. ? · If you think your pain medicine is making you sick to your stomach:  ¨ Take your medicine after meals (unless your doctor has told you not to). ¨ Ask your doctor for a different pain medicine. Incision care  ? · If you have strips of tape on the cut (incision) the doctor made, leave the tape on for a week or until it falls off. Or follow your doctor's instructions for removing the tape. ? · If you have staples closing the cut, you will need to visit your doctor in 1 to 2 weeks to have them removed. ? · Wash the area daily with warm, soapy water, and pat it dry. Don't use hydrogen peroxide or alcohol, which can slow healing. You may cover the area with a gauze bandage if it weeps or rubs against clothing. Change the bandage every day. Other instructions  ? · Hold a pillow over your incision when you cough or take deep breaths. This will support your belly and decrease your pain. ? · Do breathing exercises at home as instructed by your doctor. This will help prevent pneumonia. ? · If you had laparoscopic surgery, you may also have pain in your left shoulder. The pain usually lasts about a day or two. Follow-up care is a key part of your treatment and safety. Be sure to make and go to all appointments, and call your doctor if you are having problems. It's also a good idea to know your test results and keep a list of the medicines you take. When should you call for help? Call 911 anytime you think you may need emergency care. For example, call if:  ? · You passed out (lost consciousness). ? · You are short of breath.    ?Call your doctor now or seek immediate medical care if:  ? · You are sick to your stomach and cannot drink fluids. ? · You have signs of a blood clot in your leg (called a deep vein thrombosis), such as:  ¨ Pain in your calf, back of the knee, thigh, or groin. ¨ Redness and swelling in your leg or groin. ? · You have signs of infection, such as:  ¨ Increased pain, swelling, warmth, or redness. ¨ Red streaks leading from the incision. ¨ Pus draining from the incision. ¨ A fever. ? · You cannot pass stools or gas. ? · You have pain that does not get better after you take pain medicine. ? · You have loose stitches, or your incision comes open. ? · Bright red blood has soaked through the bandage over your incision. ? Watch closely for changes in your health, and be sure to contact your doctor if you have any problems. Where can you learn more? Go to http://pearl-eduardo.info/. Enter B577 in the search box to learn more about \"Abdominal Hernia Repair: What to Expect at Home. \"  Current as of: May 12, 2017  Content Version: 11.4  © 0517-6990 [employer]. Care instructions adapted under license by [employer] (which disclaims liability or warranty for this information). If you have questions about a medical condition or this instruction, always ask your healthcare professional. [address] any warranty or liability for your use of this information.

## 2020-12-28 RX ORDER — EMPAGLIFLOZIN 10 MG/1
TABLET, FILM COATED ORAL
Qty: 90 TAB | Refills: 0 | Status: SHIPPED | OUTPATIENT
Start: 2020-12-28

## 2020-12-29 ENCOUNTER — TRANSCRIBE ORDER (OUTPATIENT)
Dept: SCHEDULING | Age: 75
End: 2020-12-29

## 2020-12-29 DIAGNOSIS — M25.552 LEFT HIP PAIN: Primary | ICD-10-CM

## 2021-01-04 ENCOUNTER — HOSPITAL ENCOUNTER (OUTPATIENT)
Dept: MRI IMAGING | Age: 76
Discharge: HOME | End: 2021-01-04
Attending: ORTHOPAEDIC SURGERY
Payer: MEDICARE

## 2021-01-04 DIAGNOSIS — M25.552 LEFT HIP PAIN: ICD-10-CM

## 2021-01-04 PROCEDURE — 73721 MRI JNT OF LWR EXTRE W/O DYE: CPT

## 2022-03-18 PROBLEM — E11.9 TYPE 2 DIABETES MELLITUS WITHOUT COMPLICATION, WITHOUT LONG-TERM CURRENT USE OF INSULIN (HCC): Status: ACTIVE | Noted: 2019-03-21

## 2022-03-18 PROBLEM — G45.4 TRANSIENT GLOBAL AMNESIA: Status: ACTIVE | Noted: 2018-03-19

## 2022-03-19 PROBLEM — R93.1 AGATSTON CAC SCORE, >400: Status: ACTIVE | Noted: 2019-03-21

## 2022-03-19 PROBLEM — G47.33 OSA ON CPAP: Status: ACTIVE | Noted: 2018-03-19

## 2022-03-19 PROBLEM — E78.5 DYSLIPIDEMIA: Status: ACTIVE | Noted: 2019-03-21

## 2022-03-19 PROBLEM — I10 HTN (HYPERTENSION): Status: ACTIVE | Noted: 2018-03-19

## 2022-03-19 PROBLEM — Z99.89 OSA ON CPAP: Status: ACTIVE | Noted: 2018-03-19

## 2022-03-20 PROBLEM — K21.9 GERD (GASTROESOPHAGEAL REFLUX DISEASE): Status: ACTIVE | Noted: 2018-03-19

## 2023-05-11 RX ORDER — BETAMETHASONE DIPROPIONATE 0.05 %
OINTMENT (GRAM) TOPICAL 2 TIMES DAILY
COMMUNITY

## 2023-05-11 RX ORDER — UBIDECARENONE 100 MG
100 CAPSULE ORAL DAILY
COMMUNITY

## 2023-05-11 RX ORDER — MOMETASONE FUROATE 1 MG/G
OINTMENT TOPICAL PRN
COMMUNITY

## 2023-05-11 RX ORDER — NABUMETONE 500 MG/1
TABLET, FILM COATED ORAL 2 TIMES DAILY
COMMUNITY

## 2023-05-11 RX ORDER — AZELASTINE 1 MG/ML
1 SPRAY, METERED NASAL PRN
COMMUNITY

## 2023-05-11 RX ORDER — DIAZEPAM 5 MG/1
TABLET ORAL EVERY 8 HOURS PRN
COMMUNITY
Start: 2016-02-09

## 2023-05-11 RX ORDER — ERGOCALCIFEROL 1.25 MG/1
CAPSULE ORAL
COMMUNITY

## 2023-05-11 RX ORDER — ATORVASTATIN CALCIUM 10 MG/1
TABLET, FILM COATED ORAL DAILY
COMMUNITY

## 2023-05-11 RX ORDER — LOSARTAN POTASSIUM 100 MG/1
100 TABLET ORAL NIGHTLY
COMMUNITY

## 2023-05-11 RX ORDER — PANTOPRAZOLE SODIUM 20 MG/1
TABLET, DELAYED RELEASE ORAL
COMMUNITY

## 2023-05-11 RX ORDER — ASPIRIN 81 MG/1
TABLET ORAL
COMMUNITY

## 2023-05-11 RX ORDER — FEXOFENADINE HCL AND PSEUDOEPHEDRINE HCI 60; 120 MG/1; MG/1
1 TABLET, EXTENDED RELEASE ORAL
COMMUNITY

## 2024-02-12 ENCOUNTER — HOSPITAL ENCOUNTER (OUTPATIENT)
Facility: HOSPITAL | Age: 79
Discharge: HOME | End: 2024-02-15
Attending: STUDENT IN AN ORGANIZED HEALTH CARE EDUCATION/TRAINING PROGRAM
Payer: MEDICARE

## 2024-02-12 DIAGNOSIS — M54.50 LUMBAR PAIN: ICD-10-CM

## 2024-02-12 DIAGNOSIS — M54.16 LUMBAR RADICULOPATHY: ICD-10-CM

## 2024-02-12 PROCEDURE — 72148 MRI LUMBAR SPINE W/O DYE: CPT

## (undated) DEVICE — DRAPE,REIN 53X77,STERILE: Brand: MEDLINE

## (undated) DEVICE — SURGICAL PROCEDURE KIT GEN LAPAROSCOPY LF

## (undated) DEVICE — INFECTION CONTROL KIT SYS

## (undated) DEVICE — LIGHT HANDLE: Brand: DEVON

## (undated) DEVICE — TUBING INSUFLTN 10FT LUER -- CONVERT TO ITEM 368568

## (undated) DEVICE — 3M™ TEGADERM™ TRANSPARENT FILM DRESSING FRAME STYLE, 1624W, 2-3/8 IN X 2-3/4 IN (6 CM X 7 CM), 100/CT 4CT/CASE: Brand: 3M™ TEGADERM™

## (undated) DEVICE — DEVON™ KNEE AND BODY STRAP 60" X 3" (1.5 M X 7.6 CM): Brand: DEVON

## (undated) DEVICE — STAPLER INT DIA5MM 25 ABSRB STRP FIX DISP FOR HERN MESH

## (undated) DEVICE — TOWEL,OR,DSP,ST,BLUE,STD,2/PK,40PK/CS: Brand: MEDLINE

## (undated) DEVICE — Device

## (undated) DEVICE — SUTURE MCRYL SZ 4-0 L27IN ABSRB UD L19MM PS-2 1/2 CIR PRIM Y426H

## (undated) DEVICE — SYR 10ML LUER LOK 1/5ML GRAD --

## (undated) DEVICE — KENDALL SCD EXPRESS SLEEVES, KNEE LENGTH, MEDIUM: Brand: KENDALL SCD

## (undated) DEVICE — (D)PREP SKN CHLRAPRP APPL 26ML -- CONVERT TO ITEM 371833

## (undated) DEVICE — TELFA NON-ADHERENT ABSORBENT DRESSING: Brand: TELFA

## (undated) DEVICE — STERILE POLYISOPRENE POWDER-FREE SURGICAL GLOVES: Brand: PROTEXIS

## (undated) DEVICE — BLADELESS OPTICAL TROCAR WITH FIXATION CANNULA: Brand: VERSAPORT

## (undated) DEVICE — UNIVERSAL FIXATION CANNULA: Brand: VERSAONE

## (undated) DEVICE — Z DISCONTINUED NO SUB IDED TROCAR LAP DIA8MM STD LEN BLDELSS TAPR TIP THRD N OPT VW

## (undated) DEVICE — (D)STRIP SKN CLSR 0.5X4IN WHT --

## (undated) DEVICE — DEVICE TRNSF SPIK STL 2008S] MICROTEK MEDICAL INC]

## (undated) DEVICE — REM POLYHESIVE ADULT PATIENT RETURN ELECTRODE: Brand: VALLEYLAB

## (undated) DEVICE — SUTURE VCRL SZ 0 L18IN ABSRB UD POLYGLACTIN 910 COAT BRAID J646H

## (undated) DEVICE — SOL IRRIGATION INJ NACL 0.9% 500ML BTL

## (undated) DEVICE — 3M™ IOBAN™ 2 ANTIMICROBIAL INCISE DRAPE 6650EZ: Brand: IOBAN™ 2

## (undated) DEVICE — NEEDLE HYPO 22GA L1.5IN BLK S STL HUB POLYPR SHLD REG BVL

## (undated) DEVICE — SUTURE SZ 0 27IN 5/8 CIR UR-6  TAPER PT VIOLET ABSRB VICRYL J603H

## (undated) DEVICE — CLICKLINE SCISSORS INSERT: Brand: CLICKLINE